# Patient Record
Sex: FEMALE | Race: WHITE | NOT HISPANIC OR LATINO | Employment: FULL TIME | URBAN - METROPOLITAN AREA
[De-identification: names, ages, dates, MRNs, and addresses within clinical notes are randomized per-mention and may not be internally consistent; named-entity substitution may affect disease eponyms.]

---

## 2023-04-04 ENCOUNTER — TELEPHONE (OUTPATIENT)
Dept: PRIMARY CARE | Facility: CLINIC | Age: 38
End: 2023-04-04
Payer: COMMERCIAL

## 2023-04-04 DIAGNOSIS — Z00.00 ENCOUNTER FOR PREVENTATIVE ADULT HEALTH CARE EXAMINATION: Primary | ICD-10-CM

## 2023-04-04 DIAGNOSIS — R79.89 LOW VITAMIN D LEVEL: ICD-10-CM

## 2023-04-07 ENCOUNTER — LAB (OUTPATIENT)
Dept: LAB | Facility: LAB | Age: 38
End: 2023-04-07
Payer: COMMERCIAL

## 2023-04-07 DIAGNOSIS — Z00.00 ENCOUNTER FOR PREVENTATIVE ADULT HEALTH CARE EXAMINATION: ICD-10-CM

## 2023-04-07 DIAGNOSIS — R79.89 LOW VITAMIN D LEVEL: ICD-10-CM

## 2023-04-07 PROBLEM — R29.898 PROXIMAL LEG WEAKNESS: Status: ACTIVE | Noted: 2023-04-07

## 2023-04-07 PROBLEM — U07.1 COVID-19 VIRUS DETECTED: Status: ACTIVE | Noted: 2023-04-07

## 2023-04-07 PROBLEM — M54.16 CHRONIC LUMBAR RADICULOPATHY: Status: ACTIVE | Noted: 2023-04-07

## 2023-04-07 PROBLEM — M51.26 LUMBAR DISC DISPLACEMENT WITHOUT MYELOPATHY: Status: ACTIVE | Noted: 2023-04-07

## 2023-04-07 PROBLEM — E66.9 CLASS 1 OBESITY WITH BODY MASS INDEX (BMI) OF 34.0 TO 34.9 IN ADULT: Status: ACTIVE | Noted: 2023-04-07

## 2023-04-07 PROBLEM — M54.50 LUMBAR PAIN: Status: ACTIVE | Noted: 2023-04-07

## 2023-04-07 PROBLEM — E66.9 OBESITY (BMI 35.0-39.9 WITHOUT COMORBIDITY): Status: ACTIVE | Noted: 2023-04-07

## 2023-04-07 PROBLEM — R60.0 PERIPHERAL EDEMA: Status: ACTIVE | Noted: 2023-04-07

## 2023-04-07 PROBLEM — G25.81 RESTLESS LEGS: Status: ACTIVE | Noted: 2023-04-07

## 2023-04-07 PROBLEM — R07.89 ATYPICAL CHEST PAIN: Status: ACTIVE | Noted: 2023-04-07

## 2023-04-07 PROBLEM — M47.817 LUMBOSACRAL SPONDYLOSIS: Status: ACTIVE | Noted: 2023-04-07

## 2023-04-07 PROBLEM — R59.0 SUPRACLAVICULAR ADENOPATHY: Status: ACTIVE | Noted: 2023-04-07

## 2023-04-07 PROBLEM — E66.811 CLASS 1 OBESITY WITH BODY MASS INDEX (BMI) OF 34.0 TO 34.9 IN ADULT: Status: ACTIVE | Noted: 2023-04-07

## 2023-04-07 PROBLEM — I83.93 VARICOSE VEINS OF LEGS: Status: ACTIVE | Noted: 2023-04-07

## 2023-04-07 PROBLEM — G43.909 MIGRAINE HEADACHE: Status: ACTIVE | Noted: 2023-04-07

## 2023-04-07 PROBLEM — M71.30 SYNOVIAL CYST: Status: ACTIVE | Noted: 2023-04-07

## 2023-04-07 PROBLEM — M54.50 LOW BACK ACHE: Status: ACTIVE | Noted: 2023-04-07

## 2023-04-07 PROBLEM — R60.9 PERIPHERAL EDEMA: Status: ACTIVE | Noted: 2023-04-07

## 2023-04-07 LAB
ALANINE AMINOTRANSFERASE (SGPT) (U/L) IN SER/PLAS: 17 U/L (ref 7–45)
ALBUMIN (G/DL) IN SER/PLAS: 3.7 G/DL (ref 3.4–5)
ALKALINE PHOSPHATASE (U/L) IN SER/PLAS: 55 U/L (ref 33–110)
ANION GAP IN SER/PLAS: 11 MMOL/L (ref 10–20)
ASPARTATE AMINOTRANSFERASE (SGOT) (U/L) IN SER/PLAS: 13 U/L (ref 9–39)
BASOPHILS (10*3/UL) IN BLOOD BY AUTOMATED COUNT: 0.05 X10E9/L (ref 0–0.1)
BASOPHILS/100 LEUKOCYTES IN BLOOD BY AUTOMATED COUNT: 0.8 % (ref 0–2)
BILIRUBIN TOTAL (MG/DL) IN SER/PLAS: 0.6 MG/DL (ref 0–1.2)
CALCIUM (MG/DL) IN SER/PLAS: 8.6 MG/DL (ref 8.6–10.3)
CARBON DIOXIDE, TOTAL (MMOL/L) IN SER/PLAS: 28 MMOL/L (ref 21–32)
CHLORIDE (MMOL/L) IN SER/PLAS: 103 MMOL/L (ref 98–107)
CHOLESTEROL (MG/DL) IN SER/PLAS: 197 MG/DL (ref 0–199)
CHOLESTEROL IN HDL (MG/DL) IN SER/PLAS: 54.5 MG/DL
CHOLESTEROL/HDL RATIO: 3.6
CREATININE (MG/DL) IN SER/PLAS: 0.76 MG/DL (ref 0.5–1.05)
EOSINOPHILS (10*3/UL) IN BLOOD BY AUTOMATED COUNT: 0.15 X10E9/L (ref 0–0.7)
EOSINOPHILS/100 LEUKOCYTES IN BLOOD BY AUTOMATED COUNT: 2.3 % (ref 0–6)
ERYTHROCYTE DISTRIBUTION WIDTH (RATIO) BY AUTOMATED COUNT: 12.8 % (ref 11.5–14.5)
ERYTHROCYTE MEAN CORPUSCULAR HEMOGLOBIN CONCENTRATION (G/DL) BY AUTOMATED: 31 G/DL (ref 32–36)
ERYTHROCYTE MEAN CORPUSCULAR VOLUME (FL) BY AUTOMATED COUNT: 88 FL (ref 80–100)
ERYTHROCYTES (10*6/UL) IN BLOOD BY AUTOMATED COUNT: 4.74 X10E12/L (ref 4–5.2)
GFR FEMALE: >90 ML/MIN/1.73M2
GLUCOSE (MG/DL) IN SER/PLAS: 78 MG/DL (ref 74–99)
HEMATOCRIT (%) IN BLOOD BY AUTOMATED COUNT: 41.9 % (ref 36–46)
HEMOGLOBIN (G/DL) IN BLOOD: 13 G/DL (ref 12–16)
IMMATURE GRANULOCYTES/100 LEUKOCYTES IN BLOOD BY AUTOMATED COUNT: 0.2 % (ref 0–0.9)
LDL: 120 MG/DL (ref 0–99)
LEUKOCYTES (10*3/UL) IN BLOOD BY AUTOMATED COUNT: 6.5 X10E9/L (ref 4.4–11.3)
LYMPHOCYTES (10*3/UL) IN BLOOD BY AUTOMATED COUNT: 2.93 X10E9/L (ref 1.2–4.8)
LYMPHOCYTES/100 LEUKOCYTES IN BLOOD BY AUTOMATED COUNT: 44.8 % (ref 13–44)
MONOCYTES (10*3/UL) IN BLOOD BY AUTOMATED COUNT: 0.51 X10E9/L (ref 0.1–1)
MONOCYTES/100 LEUKOCYTES IN BLOOD BY AUTOMATED COUNT: 7.8 % (ref 2–10)
NEUTROPHILS (10*3/UL) IN BLOOD BY AUTOMATED COUNT: 2.89 X10E9/L (ref 1.2–7.7)
NEUTROPHILS/100 LEUKOCYTES IN BLOOD BY AUTOMATED COUNT: 44.1 % (ref 40–80)
PLATELETS (10*3/UL) IN BLOOD AUTOMATED COUNT: 268 X10E9/L (ref 150–450)
POTASSIUM (MMOL/L) IN SER/PLAS: 4.3 MMOL/L (ref 3.5–5.3)
PROTEIN TOTAL: 6.4 G/DL (ref 6.4–8.2)
SODIUM (MMOL/L) IN SER/PLAS: 138 MMOL/L (ref 136–145)
THYROTROPIN (MIU/L) IN SER/PLAS BY DETECTION LIMIT <= 0.05 MIU/L: 3.23 MIU/L (ref 0.44–3.98)
TRIGLYCERIDE (MG/DL) IN SER/PLAS: 113 MG/DL (ref 0–149)
UREA NITROGEN (MG/DL) IN SER/PLAS: 11 MG/DL (ref 6–23)
VLDL: 23 MG/DL (ref 0–40)

## 2023-04-07 PROCEDURE — 84443 ASSAY THYROID STIM HORMONE: CPT

## 2023-04-07 PROCEDURE — 80053 COMPREHEN METABOLIC PANEL: CPT

## 2023-04-07 PROCEDURE — 80061 LIPID PANEL: CPT

## 2023-04-07 PROCEDURE — 82306 VITAMIN D 25 HYDROXY: CPT

## 2023-04-07 PROCEDURE — 36415 COLL VENOUS BLD VENIPUNCTURE: CPT

## 2023-04-07 PROCEDURE — 85025 COMPLETE CBC W/AUTO DIFF WBC: CPT

## 2023-04-07 RX ORDER — ALBUTEROL SULFATE 90 UG/1
AEROSOL, METERED RESPIRATORY (INHALATION)
COMMUNITY
Start: 2015-12-04

## 2023-04-07 RX ORDER — MELOXICAM 15 MG/1
1 TABLET ORAL DAILY PRN
COMMUNITY
Start: 2022-01-25

## 2023-04-07 RX ORDER — SUMATRIPTAN 50 MG/1
50 TABLET, FILM COATED ORAL
COMMUNITY
Start: 2016-08-10

## 2023-04-07 RX ORDER — METHOCARBAMOL 750 MG/1
1 TABLET, FILM COATED ORAL NIGHTLY
COMMUNITY
Start: 2022-01-25

## 2023-04-07 RX ORDER — ETONOGESTREL AND ETHINYL ESTRADIOL VAGINAL RING .015; .12 MG/D; MG/D
RING VAGINAL
COMMUNITY
Start: 2014-02-18

## 2023-04-08 LAB — CALCIDIOL (25 OH VITAMIN D3) (NG/ML) IN SER/PLAS: 53 NG/ML

## 2023-04-10 ENCOUNTER — OFFICE VISIT (OUTPATIENT)
Dept: PRIMARY CARE | Facility: CLINIC | Age: 38
End: 2023-04-10
Payer: COMMERCIAL

## 2023-04-10 VITALS
SYSTOLIC BLOOD PRESSURE: 126 MMHG | DIASTOLIC BLOOD PRESSURE: 78 MMHG | WEIGHT: 220 LBS | HEART RATE: 93 BPM | BODY MASS INDEX: 34.53 KG/M2 | HEIGHT: 67 IN | OXYGEN SATURATION: 97 %

## 2023-04-10 DIAGNOSIS — L30.9 DERMATITIS: Primary | ICD-10-CM

## 2023-04-10 PROCEDURE — 99395 PREV VISIT EST AGE 18-39: CPT | Performed by: FAMILY MEDICINE

## 2023-04-10 PROCEDURE — 1036F TOBACCO NON-USER: CPT | Performed by: FAMILY MEDICINE

## 2023-04-10 PROCEDURE — 99213 OFFICE O/P EST LOW 20 MIN: CPT | Performed by: FAMILY MEDICINE

## 2023-04-10 RX ORDER — MULTIVITAMIN/IRON/FOLIC ACID 18MG-0.4MG
1 TABLET ORAL DAILY
COMMUNITY

## 2023-04-10 RX ORDER — MULTIVITAMIN
1 TABLET ORAL DAILY
COMMUNITY

## 2023-04-10 RX ORDER — KETOCONAZOLE 20 MG/G
CREAM TOPICAL 2 TIMES DAILY
Qty: 60 G | Refills: 0 | Status: SHIPPED | OUTPATIENT
Start: 2023-04-10 | End: 2024-04-09

## 2023-04-10 ASSESSMENT — PATIENT HEALTH QUESTIONNAIRE - PHQ9
1. LITTLE INTEREST OR PLEASURE IN DOING THINGS: NOT AT ALL
2. FEELING DOWN, DEPRESSED OR HOPELESS: NOT AT ALL
SUM OF ALL RESPONSES TO PHQ9 QUESTIONS 1 AND 2: 0

## 2023-04-10 NOTE — PATIENT INSTRUCTIONS
We reviewed you labs.     Please look into the use of Phentermine or Semaglutide ( there are some cash pay weight loss clinics in the area).     BP looks great.     For the armpit itching, try the ketoconazole cream .     Follow up in 1 year or sooner as needed.

## 2023-04-10 NOTE — PROGRESS NOTES
Subjective   Patient ID: Kat Mcghee is a 37 y.o. female who presents for Annual Exam.    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   - on 1/17/22 ordered MRI and referral to pain management and ortho spine - MRI was denied  - has since seen Spine and Pain and both agree MRI would be greatly helpful to see if cyst has returned   - hurts so bad, 10/10 pain now   - pressure, pain in thighs and legs are getting weak   - starting on medrol dose pack 11/29/21- no improvement   - also strained her ankle doing lunges   - used old pain bills and flexeril - made her groggy   - motrin, stretches at home   - h/o disc herniations     #) supraclavicular LAD on the side of COVID vaccination - resolved   - no booster yet   - did not have live covid that she knows of     #) skin changes - not too bad  - varicose veins   - some throbbing and restless lags   - referred to vascular med- didn’t want to get the procedure   - is wearing compression stocking    #) Migraines - not as frequent  - taking Vit D and B complex, drinking more water   - seems like they are worse after pregnancy   - Motrin, no longer using the imitrex  - Cambia not covered  - tried ubrelvy     #) Asthma - mild-- feels better   - exacerbated with physical activity   - no need for inhaler     #) circulation - brother had a brain aneurysm at 45  - other than a smoker, no chronic condition  - with pt having headaches she is worried   - normal ECHO and carotid US     #) Heart??  - daughter has a bicuspid aortic valve    - normal ECHO in 2021      Review of Systems    Objective   /78   Pulse 93   Ht  "1.702 m (5' 7\")   Wt 99.8 kg (220 lb)   SpO2 97%   BMI 34.46 kg/m²     Physical Exam  Vitals reviewed.   Constitutional:       General: She is not in acute distress.     Appearance: She is not toxic-appearing.   HENT:      Head: Normocephalic and atraumatic.      Right Ear: Tympanic membrane and ear canal normal. There is no impacted cerumen.      Left Ear: Tympanic membrane and ear canal normal. There is no impacted cerumen.      Nose: No congestion or rhinorrhea.      Mouth/Throat:      Mouth: Mucous membranes are moist.      Pharynx: No oropharyngeal exudate or posterior oropharyngeal erythema.   Eyes:      Extraocular Movements: Extraocular movements intact.      Conjunctiva/sclera: Conjunctivae normal.      Pupils: Pupils are equal, round, and reactive to light.   Cardiovascular:      Rate and Rhythm: Normal rate and regular rhythm.      Heart sounds: Normal heart sounds. No murmur heard.  Pulmonary:      Effort: No respiratory distress.      Breath sounds: No wheezing.   Abdominal:      General: Bowel sounds are normal.      Palpations: Abdomen is soft.      Tenderness: There is no abdominal tenderness.   Musculoskeletal:         General: No deformity. Normal range of motion.      Cervical back: Neck supple. No tenderness.      Right lower leg: No edema.      Left lower leg: No edema.   Lymphadenopathy:      Cervical: No cervical adenopathy.   Skin:     Findings: No bruising or rash.   Neurological:      Mental Status: She is alert.      Cranial Nerves: No cranial nerve deficit.      Motor: No weakness.      Gait: Gait normal.   Psychiatric:         Mood and Affect: Mood normal.       Assessment/Plan   Problem List Items Addressed This Visit    None  Visit Diagnoses       Dermatitis    -  Primary    Relevant Medications    ketoconazole (NIZOral) 2 % cream        We reviewed you labs.     Please look into the use of Phentermine or Semaglutide ( there are some cash pay weight loss clinics in the area).     BP " looks great.     For the armpit itching, try the ketoconazole cream .

## 2023-04-17 ENCOUNTER — TELEPHONE (OUTPATIENT)
Dept: PRIMARY CARE | Facility: CLINIC | Age: 38
End: 2023-04-17
Payer: COMMERCIAL

## 2023-04-17 DIAGNOSIS — E66.1 CLASS 1 DRUG-INDUCED OBESITY WITH SERIOUS COMORBIDITY AND BODY MASS INDEX (BMI) OF 34.0 TO 34.9 IN ADULT: Primary | ICD-10-CM

## 2023-04-17 DIAGNOSIS — M54.16 CHRONIC LUMBAR RADICULOPATHY: ICD-10-CM

## 2023-04-18 RX ORDER — SEMAGLUTIDE 0.25 MG/.5ML
0.25 INJECTION, SOLUTION SUBCUTANEOUS
Qty: 2 ML | Refills: 11 | Status: SHIPPED | OUTPATIENT
Start: 2023-04-18 | End: 2024-04-17

## 2023-04-18 NOTE — TELEPHONE ENCOUNTER
Sent in Wegovy RX, we will see if your insurance will approve it.     Obesity is a chronic health condition and will require life long treatment either in the form of continued lifestyle changes, medication and/or both.   Weight loss goals: 50#  Current BMI is 35 with current weight 220  Goal BMI is less than 30. Although a reduction in 5% body weight is still greatly beneficial to your cardiovascular health.   There is a chance of losing up to 9.6-16% of your body weight in the next year if this medication is continued for at least one year in combination with diet and exercise.   You need to continue to work on healthy low calorie diets, login your calories in an NIEVES such as [employer] can be helpful to stay on track.   Also get at least 150 mins of regular exercise per week (more to speed up weight loss).  Long term safety of Wegovy was discussed. Please notify the office if you experience severe GI upset, diarrhea or vomiting, or any other changes in your health that may be related to this medication.

## 2023-04-29 DIAGNOSIS — M54.30 SCIATICA, UNSPECIFIED LATERALITY: Primary | ICD-10-CM

## 2023-04-29 RX ORDER — METHYLPREDNISOLONE 4 MG/1
TABLET ORAL
Qty: 21 TABLET | Refills: 0 | Status: SHIPPED | OUTPATIENT
Start: 2023-04-29 | End: 2023-05-06

## 2023-05-01 RX ORDER — PREDNISONE 20 MG/1
20 TABLET ORAL DAILY
COMMUNITY
Start: 2019-10-28

## 2023-05-05 ENCOUNTER — OFFICE VISIT (OUTPATIENT)
Dept: PRIMARY CARE | Facility: CLINIC | Age: 38
End: 2023-05-05
Payer: COMMERCIAL

## 2023-05-05 VITALS
HEART RATE: 74 BPM | DIASTOLIC BLOOD PRESSURE: 80 MMHG | SYSTOLIC BLOOD PRESSURE: 122 MMHG | WEIGHT: 212 LBS | BODY MASS INDEX: 33.2 KG/M2 | OXYGEN SATURATION: 96 %

## 2023-05-05 DIAGNOSIS — E66.09 CLASS 1 OBESITY DUE TO EXCESS CALORIES WITHOUT SERIOUS COMORBIDITY WITH BODY MASS INDEX (BMI) OF 33.0 TO 33.9 IN ADULT: ICD-10-CM

## 2023-05-05 DIAGNOSIS — M54.16 CHRONIC LUMBAR RADICULOPATHY: Primary | ICD-10-CM

## 2023-05-05 PROCEDURE — 3008F BODY MASS INDEX DOCD: CPT | Performed by: FAMILY MEDICINE

## 2023-05-05 PROCEDURE — 1036F TOBACCO NON-USER: CPT | Performed by: FAMILY MEDICINE

## 2023-05-05 PROCEDURE — 99213 OFFICE O/P EST LOW 20 MIN: CPT | Performed by: FAMILY MEDICINE

## 2023-05-05 PROCEDURE — 80307 DRUG TEST PRSMV CHEM ANLYZR: CPT

## 2023-05-05 RX ORDER — PHENTERMINE HYDROCHLORIDE 37.5 MG/1
37.5 TABLET ORAL
Qty: 30 TABLET | Refills: 0 | Status: SHIPPED | OUTPATIENT
Start: 2023-05-05 | End: 2023-12-08

## 2023-05-05 ASSESSMENT — PATIENT HEALTH QUESTIONNAIRE - PHQ9
1. LITTLE INTEREST OR PLEASURE IN DOING THINGS: NOT AT ALL
SUM OF ALL RESPONSES TO PHQ9 QUESTIONS 1 AND 2: 0
2. FEELING DOWN, DEPRESSED OR HOPELESS: NOT AT ALL

## 2023-05-05 NOTE — PROGRESS NOTES
Subjective   Patient ID: Kat Mcghee is a 37 y.o. female who presents for Follow-up (Discuss starting on Adipex).    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   - no insomnia, BP is good, no chest pain , palpitations   - new job, working with [employer]     #) back is better- will refer to new pain med doc   - flares seem to responsive to steroids   - on 1/17/22 ordered MRI and referral to pain management and ortho spine - MRI was denied  - has since seen Spine and Pain and both agree MRI would be greatly helpful to see if cyst has returned   - hurts so bad, 10/10 pain now   - pressure, pain in thighs and legs are getting weak   - starting on medrol dose pack 11/29/21- no improvement   - also strained her ankle doing lunges   - used old pain bills and flexeril - made her groggy   - motrin, stretches at home   - h/o disc herniations     #) supraclavicular LAD on the side of COVID vaccination - resolved   - no booster yet   - did not have live covid that she knows of     #) skin changes - not too bad  - varicose veins   - some throbbing and restless lags   - referred to vascular med- didn’t want to get the procedure   - is wearing compression stocking    #) Migraines - not as frequent  - taking Vit D and B complex, drinking more water   - seems like they are worse after pregnancy   - Motrin, no longer using the imitrex  - Cambia not covered  - tried ubrelvy     #) Asthma - mild-- feels better   - exacerbated with physical activity   - no need for inhaler     #) circulation - brother had a brain aneurysm at 45  - other than a smoker, no chronic condition  - with pt having headaches she is worried   - normal ECHO and carotid  US     #) Heart??  - daughter has a bicuspid aortic valve    - normal ECHO in 2021      Review of Systems    Objective   /80   Pulse 74   Wt 96.2 kg (212 lb)   SpO2 96%   BMI 33.20 kg/m²     Physical Exam  Vitals reviewed.   Constitutional:       General: She is not in acute distress.     Appearance: She is not toxic-appearing.   HENT:      Head: Normocephalic and atraumatic.      Right Ear: Tympanic membrane and ear canal normal. There is no impacted cerumen.      Left Ear: Tympanic membrane and ear canal normal. There is no impacted cerumen.      Nose: No congestion or rhinorrhea.      Mouth/Throat:      Mouth: Mucous membranes are moist.      Pharynx: No oropharyngeal exudate or posterior oropharyngeal erythema.   Eyes:      Extraocular Movements: Extraocular movements intact.      Conjunctiva/sclera: Conjunctivae normal.      Pupils: Pupils are equal, round, and reactive to light.   Cardiovascular:      Rate and Rhythm: Normal rate and regular rhythm.      Heart sounds: Normal heart sounds. No murmur heard.  Pulmonary:      Effort: No respiratory distress.      Breath sounds: No wheezing.   Abdominal:      General: Bowel sounds are normal.      Palpations: Abdomen is soft.      Tenderness: There is no abdominal tenderness.   Musculoskeletal:         General: No deformity. Normal range of motion.      Cervical back: Neck supple. No tenderness.      Right lower leg: No edema.      Left lower leg: No edema.   Lymphadenopathy:      Cervical: No cervical adenopathy.   Skin:     Findings: No bruising or rash.   Neurological:      Mental Status: She is alert.      Cranial Nerves: No cranial nerve deficit.      Motor: No weakness.      Gait: Gait normal.   Psychiatric:         Mood and Affect: Mood normal.       Assessment/Plan   Problem List Items Addressed This Visit    None  Start the phentermine     May cause constipation and dry mouth '    Leave a urine today     BP looks great.     Follow up in 1 month  for a weight in and refills.

## 2023-05-05 NOTE — PATIENT INSTRUCTIONS
Start the phentermine     May cause constipation and dry mouth '    Leave a urine today     BP looks great.     Follow up in 1 month for a weight in and refills.

## 2023-05-06 LAB
AMPHETAMINE (PRESENCE) IN URINE BY SCREEN METHOD: NORMAL
BARBITURATES PRESENCE IN URINE BY SCREEN METHOD: NORMAL
BENZODIAZEPINE (PRESENCE) IN URINE BY SCREEN METHOD: NORMAL
CANNABINOIDS IN URINE BY SCREEN METHOD: NORMAL
COCAINE (PRESENCE) IN URINE BY SCREEN METHOD: NORMAL
DRUG SCREEN COMMENT URINE: NORMAL
FENTANYL URINE: NORMAL
METHADONE (PRESENCE) IN URINE BY SCREEN METHOD: NORMAL
OPIATES (PRESENCE) IN URINE BY SCREEN METHOD: NORMAL
OXYCODONE (PRESENCE) IN URINE BY SCREEN METHOD: NORMAL
PHENCYCLIDINE (PRESENCE) IN URINE BY SCREEN METHOD: NORMAL

## 2023-06-02 ENCOUNTER — APPOINTMENT (OUTPATIENT)
Dept: PRIMARY CARE | Facility: CLINIC | Age: 38
End: 2023-06-02
Payer: COMMERCIAL

## 2023-08-10 ENCOUNTER — LAB (OUTPATIENT)
Dept: LAB | Facility: LAB | Age: 38
End: 2023-08-10
Payer: COMMERCIAL

## 2023-08-11 LAB — TOBACCO SCREEN, URINE: NEGATIVE

## 2023-12-06 ENCOUNTER — TELEPHONE (OUTPATIENT)
Dept: PRIMARY CARE | Facility: CLINIC | Age: 38
End: 2023-12-06
Payer: COMMERCIAL

## 2023-12-06 ENCOUNTER — PHARMACY VISIT (OUTPATIENT)
Dept: PHARMACY | Facility: CLINIC | Age: 38
End: 2023-12-06
Payer: COMMERCIAL

## 2023-12-06 PROCEDURE — RXMED WILLOW AMBULATORY MEDICATION CHARGE

## 2023-12-06 PROCEDURE — RXOTC WILLOW AMBULATORY OTC CHARGE

## 2023-12-06 NOTE — TELEPHONE ENCOUNTER
Pt. Called saying she has severe neck pain on the left side of her neck, pt. States it the same side as the lumps on her neck. Pt. Went to urgent care for help and they told her they couldn't do anything for her. Pt. Would like some direction on what to do or where to go.

## 2023-12-08 ENCOUNTER — OFFICE VISIT (OUTPATIENT)
Dept: PRIMARY CARE | Facility: CLINIC | Age: 38
End: 2023-12-08
Payer: COMMERCIAL

## 2023-12-08 VITALS
BODY MASS INDEX: 33.67 KG/M2 | DIASTOLIC BLOOD PRESSURE: 80 MMHG | OXYGEN SATURATION: 97 % | SYSTOLIC BLOOD PRESSURE: 124 MMHG | HEART RATE: 87 BPM | WEIGHT: 215 LBS

## 2023-12-08 DIAGNOSIS — K05.10 GINGIVITIS: Primary | ICD-10-CM

## 2023-12-08 PROCEDURE — 1036F TOBACCO NON-USER: CPT | Performed by: FAMILY MEDICINE

## 2023-12-08 PROCEDURE — 3008F BODY MASS INDEX DOCD: CPT | Performed by: FAMILY MEDICINE

## 2023-12-08 PROCEDURE — 99213 OFFICE O/P EST LOW 20 MIN: CPT | Performed by: FAMILY MEDICINE

## 2023-12-08 RX ORDER — DOXYCYCLINE 100 MG/1
100 CAPSULE ORAL 2 TIMES DAILY
Qty: 14 CAPSULE | Refills: 0 | Status: SHIPPED | OUTPATIENT
Start: 2023-12-08 | End: 2023-12-15

## 2023-12-08 ASSESSMENT — ENCOUNTER SYMPTOMS: NECK PAIN: 1

## 2023-12-08 ASSESSMENT — PATIENT HEALTH QUESTIONNAIRE - PHQ9
SUM OF ALL RESPONSES TO PHQ9 QUESTIONS 1 AND 2: 0
2. FEELING DOWN, DEPRESSED OR HOPELESS: NOT AT ALL
1. LITTLE INTEREST OR PLEASURE IN DOING THINGS: NOT AT ALL

## 2023-12-08 NOTE — PATIENT INSTRUCTIONS
Please switch to the doxycycline   Stop the amoxicillin     Please keep using the magic mouth wash     Please also look for the canker sore pads at the local pharmacy    BP looks good.     Monitor the lymph nodes which might take a few weeks to go down.     Please follow up as needed for preventative or sick visits.

## 2023-12-08 NOTE — PROGRESS NOTES
Subjective   Patient ID: Kat Mcghee is a 38 y.o. female who presents for Neck Pain (Left side of neck pain with swollen lump - still has sore in mouth and left ear pain/Motrin 800 mg, medicated mouth wash, Amoxicillin).    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   - no insomnia, BP is good, no chest pain , palpitations   - new job, working with [employer]     #) back is better- will refer to new pain med doc   - flares seem to responsive to steroids   - on 1/17/22 ordered MRI and referral to pain management and ortho spine - MRI was denied  - has since seen Spine and Pain and both agree MRI would be greatly helpful to see if cyst has returned   - hurts so bad, 10/10 pain now   - pressure, pain in thighs and legs are getting weak   - starting on medrol dose pack 11/29/21- no improvement   - also strained her ankle doing lunges   - used old pain bills and flexeril - made her groggy   - motrin, stretches at home   - h/o disc herniations     #) supraclavicular LAD on the side of COVID vaccination - resolved   - no booster yet   - did not have live covid that she knows of     #) skin changes - not too bad  - varicose veins   - some throbbing and restless lags   - referred to vascular med- didn’t want to get the procedure   -  is wearing compression stocking    #) Migraines - not as frequent  - taking Vit D and B complex, drinking more water   - seems like they are worse after pregnancy   - Motrin, no longer using the imitrex  - Cambia not covered  - tried ubrelvy     #) Asthma - mild-- feels better   - exacerbated with physical activity   - no need for inhaler     #) circulation - brother had a brain aneurysm at 45  - other than a smoker, no chronic condition  - with pt having headaches she is worried   - normal ECHO and carotid US     #) Heart??  - daughter has a bicuspid aortic valve    - normal ECHO in 2021      Review of Systems   Musculoskeletal:  Positive for neck pain.       Objective   /80   Pulse 87   Wt 97.5 kg (215 lb)   SpO2 97%   BMI 33.67 kg/m²     Physical Exam  Vitals reviewed.   Constitutional:       General: She is not in acute distress.     Appearance: She is not toxic-appearing.   HENT:      Head: Normocephalic and atraumatic.      Right Ear: Tympanic membrane and ear canal normal. There is no impacted cerumen.      Left Ear: Tympanic membrane and ear canal normal. There is no impacted cerumen.      Nose: No congestion or rhinorrhea.      Mouth/Throat:      Mouth: Mucous membranes are moist.      Pharynx: No oropharyngeal exudate or posterior oropharyngeal erythema.   Eyes:      Extraocular Movements: Extraocular movements intact.      Conjunctiva/sclera: Conjunctivae normal.      Pupils: Pupils are equal, round, and reactive to light.   Cardiovascular:      Rate and Rhythm: Normal rate and regular rhythm.      Heart sounds: Normal heart sounds. No murmur heard.  Pulmonary:      Effort: No respiratory distress.      Breath sounds: No wheezing.   Abdominal:      General: Bowel sounds are normal.      Palpations: Abdomen is soft.      Tenderness: There is no abdominal tenderness.   Musculoskeletal:         General: No deformity. Normal range of motion.      Cervical back: Neck supple. No tenderness.      Right  lower leg: No edema.      Left lower leg: No edema.   Lymphadenopathy:      Cervical: No cervical adenopathy.   Skin:     Findings: No bruising or rash.   Neurological:      Mental Status: She is alert.      Cranial Nerves: No cranial nerve deficit.      Motor: No weakness.      Gait: Gait normal.   Psychiatric:         Mood and Affect: Mood normal.       Assessment/Plan   Problem List Items Addressed This Visit    None    Please switch to the doxycycline   Stop the amoxicillin     Please keep using the magic mouth wash     Please also look for the canker sore pads at the local pharmacy    BP looks good.     Monitor the lymph nodes which might take a few weeks to go down.     Please follow up as needed for preventative or sick visits.

## 2023-12-22 DIAGNOSIS — S33.9XXD SPRAIN OF LIGAMENT OF LUMBOSACRAL JOINT, SUBSEQUENT ENCOUNTER: Primary | ICD-10-CM

## 2023-12-22 RX ORDER — METHYLPREDNISOLONE 4 MG/1
TABLET ORAL
Qty: 21 TABLET | Refills: 0 | Status: SHIPPED | OUTPATIENT
Start: 2023-12-22 | End: 2023-12-29

## 2024-04-15 ENCOUNTER — OFFICE VISIT (OUTPATIENT)
Dept: PRIMARY CARE | Facility: CLINIC | Age: 39
End: 2024-04-15
Payer: COMMERCIAL

## 2024-04-15 VITALS
WEIGHT: 220 LBS | HEIGHT: 67 IN | HEART RATE: 81 BPM | SYSTOLIC BLOOD PRESSURE: 131 MMHG | OXYGEN SATURATION: 97 % | BODY MASS INDEX: 34.53 KG/M2 | DIASTOLIC BLOOD PRESSURE: 78 MMHG

## 2024-04-15 DIAGNOSIS — Z00.00 PREVENTATIVE HEALTH CARE: Primary | ICD-10-CM

## 2024-04-15 DIAGNOSIS — M25.649 MORNING JOINT STIFFNESS OF HAND, UNSPECIFIED LATERALITY: ICD-10-CM

## 2024-04-15 DIAGNOSIS — E66.09 CLASS 1 OBESITY DUE TO EXCESS CALORIES WITHOUT SERIOUS COMORBIDITY WITH BODY MASS INDEX (BMI) OF 33.0 TO 33.9 IN ADULT: ICD-10-CM

## 2024-04-15 DIAGNOSIS — R79.89 LOW VITAMIN D LEVEL: ICD-10-CM

## 2024-04-15 PROCEDURE — 3008F BODY MASS INDEX DOCD: CPT | Performed by: FAMILY MEDICINE

## 2024-04-15 PROCEDURE — 99214 OFFICE O/P EST MOD 30 MIN: CPT | Performed by: FAMILY MEDICINE

## 2024-04-15 PROCEDURE — 1036F TOBACCO NON-USER: CPT | Performed by: FAMILY MEDICINE

## 2024-04-15 PROCEDURE — 99395 PREV VISIT EST AGE 18-39: CPT | Performed by: FAMILY MEDICINE

## 2024-04-15 ASSESSMENT — COLUMBIA-SUICIDE SEVERITY RATING SCALE - C-SSRS
6. HAVE YOU EVER DONE ANYTHING, STARTED TO DO ANYTHING, OR PREPARED TO DO ANYTHING TO END YOUR LIFE?: NO
1. IN THE PAST MONTH, HAVE YOU WISHED YOU WERE DEAD OR WISHED YOU COULD GO TO SLEEP AND NOT WAKE UP?: NO
2. HAVE YOU ACTUALLY HAD ANY THOUGHTS OF KILLING YOURSELF?: NO

## 2024-04-15 ASSESSMENT — ENCOUNTER SYMPTOMS
NECK PAIN: 1
DEPRESSION: 0
LOSS OF SENSATION IN FEET: 0
OCCASIONAL FEELINGS OF UNSTEADINESS: 0

## 2024-04-15 NOTE — PROGRESS NOTES
Subjective   Patient ID: Kat Mcghee is a 38 y.o. female who presents for Annual Exam (Physical ).    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   - did not like the phentermine - more anxious and off   - no insomnia, BP is good, no chest pain , palpitations   - new job, working with [employer]     #) back is better- will refer to new pain med doc   - flares seem to responsive to steroids   - on 1/17/22 ordered MRI and referral to pain management and ortho spine - MRI was denied  - has since seen Spine and Pain and both agree MRI would be greatly helpful to see if cyst has returned   - hurts so bad, 10/10 pain now   - pressure, pain in thighs and legs are getting weak   - starting on medrol dose pack 11/29/21- no improvement   - also strained her ankle doing lunges   - used old pain bills and flexeril - made her groggy   - motrin, stretches at home   - h/o disc herniations     #) supraclavicular LAD on the side of COVID vaccination - resolved   - no booster yet   - did not have live covid that she knows of     #) skin changes - not too bad  - varicose veins   - some throbbing and restless lags   - referred to vascular med- didn’t want to get the procedure   - is wearing compression stocking    #) Migraines - not as frequent  - taking Vit D and B complex, drinking more water   -  "seems like they are worse after pregnancy   - Motrin, no longer using the imitrex  - Cambia not covered  - tried ubrelvy     #) Asthma - mild-- feels better   - exacerbated with physical activity   - no need for inhaler     #) circulation - brother had a brain aneurysm at 45  - other than a smoker, no chronic condition  - with pt having headaches she is worried   - normal ECHO and carotid US     #) Heart??  - daughter has a bicuspid aortic valve    - normal ECHO in 2021      Review of Systems   Musculoskeletal:  Positive for neck pain.       Objective   /86   Pulse 81   Ht 1.702 m (5' 7\")   Wt 99.8 kg (220 lb)   SpO2 97%   BMI 34.46 kg/m²     Physical Exam  Vitals reviewed.   Constitutional:       General: She is not in acute distress.     Appearance: She is not toxic-appearing.   HENT:      Head: Normocephalic and atraumatic.      Right Ear: Tympanic membrane and ear canal normal. There is no impacted cerumen.      Left Ear: Tympanic membrane and ear canal normal. There is no impacted cerumen.      Nose: No congestion or rhinorrhea.      Mouth/Throat:      Mouth: Mucous membranes are moist.      Pharynx: No oropharyngeal exudate or posterior oropharyngeal erythema.   Eyes:      Extraocular Movements: Extraocular movements intact.      Conjunctiva/sclera: Conjunctivae normal.      Pupils: Pupils are equal, round, and reactive to light.   Cardiovascular:      Rate and Rhythm: Normal rate and regular rhythm.      Heart sounds: Normal heart sounds. No murmur heard.  Pulmonary:      Effort: No respiratory distress.      Breath sounds: No wheezing.   Abdominal:      General: Bowel sounds are normal.      Palpations: Abdomen is soft.      Tenderness: There is no abdominal tenderness.   Musculoskeletal:         General: No deformity. Normal range of motion.      Cervical back: Neck supple. No tenderness.      Right lower leg: No edema.      Left lower leg: No edema.   Lymphadenopathy:      Cervical: No cervical " adenopathy.   Skin:     Findings: No bruising or rash.   Neurological:      Mental Status: She is alert.      Cranial Nerves: No cranial nerve deficit.      Motor: No weakness.      Gait: Gait normal.   Psychiatric:         Mood and Affect: Mood normal.       Assessment/Plan   Problem List Items Addressed This Visit    None  Please get updated fasting blood work     We sampled you the Ozempicc today to give you 6 weeks of shots for free.   Then message me if you would like to continue with the medication as a compound since your insurance does not cover it.   Even though medications like Wegovy (semaglutide) and Zepbound (tirzepatide) demonstrate remarkable effectiveness for weight loss, frustration exists on accessing such Food and Drug Administration (FDA)-approved treatments due to medication shortages, lack of insurance coverage and high out-of-pocket costs. Such barriers can result in patients looking for available alternatives, such as compounded versions. Please be aware that compounded versions of semaglutide and tirzepatide may not be what they are advertised to be. These compounded versions are not the same as the drug provided by the manufacturers, they are considered “generics” or have been modified in some way for legal distribution. The FDA stated very clearly that, “Patients should be aware that some products sold as 'semaglutide' may not contain the same active ingredient as FDA-approved semaglutide products and may be the salt formulations.” We will be sending your prescription to [facility] in [place], [place]. They started in 1999 and have received recognition as a PCAB accredited pharmacy in both sterile and non-sterile compounding. The American Medical Association recommends only using BCAB accredited pharmacies, and only 1% of pharmacies meet that standard. Once they receive your RX, they should be reaching out to you for payment and shipping information. Please let us know  when you get it, so we may assist in administration guidance.   Look at [email or web address]     Please monitor the urinary incontinence , try some home Kegel exercises.     We could refer you to Dr Mcdaniel     Try the neurtec as needed for headaches.     Follow up in 90 days for weight check

## 2024-04-15 NOTE — PATIENT INSTRUCTIONS
Please get updated fasting blood work     We sampled you the Ozempicc today to give you 6 weeks of shots for free.   Then message me if you would like to continue with the medication as a compound since your insurance does not cover it.   Even though medications like Wegovy (semaglutide) and Zepbound (tirzepatide) demonstrate remarkable effectiveness for weight loss, frustration exists on accessing such Food and Drug Administration (FDA)-approved treatments due to medication shortages, lack of insurance coverage and high out-of-pocket costs. Such barriers can result in patients looking for available alternatives, such as compounded versions. Please be aware that compounded versions of semaglutide and tirzepatide may not be what they are advertised to be. These compounded versions are not the same as the drug provided by the manufacturers, they are considered “generics” or have been modified in some way for legal distribution. The FDA stated very clearly that, “Patients should be aware that some products sold as 'semaglutide' may not contain the same active ingredient as FDA-approved semaglutide products and may be the salt formulations.” We will be sending your prescription to [facility] in [place], [place]. They started in 1999 and have received recognition as a PCAB accredited pharmacy in both sterile and non-sterile compounding. The American Medical Association recommends only using BCAB accredited pharmacies, and only 1% of pharmacies meet that standard. Once they receive your RX, they should be reaching out to you for payment and shipping information. Please let us know when you get it, so we may assist in administration guidance.   Look at [email or web address]     Please monitor the urinary incontinence , try some home Kegel exercises.     We could refer you to Dr Mcdaniel     Try the neurtec as needed for headaches.     Follow up in 90 days for weight check

## 2024-04-20 ENCOUNTER — LAB (OUTPATIENT)
Dept: LAB | Facility: LAB | Age: 39
End: 2024-04-20
Payer: COMMERCIAL

## 2024-04-20 DIAGNOSIS — Z00.00 PREVENTATIVE HEALTH CARE: ICD-10-CM

## 2024-04-20 DIAGNOSIS — R79.89 LOW VITAMIN D LEVEL: ICD-10-CM

## 2024-04-20 DIAGNOSIS — M25.649 MORNING JOINT STIFFNESS OF HAND, UNSPECIFIED LATERALITY: ICD-10-CM

## 2024-04-20 LAB
25(OH)D3 SERPL-MCNC: 73 NG/ML (ref 30–100)
ALBUMIN SERPL BCP-MCNC: 4.3 G/DL (ref 3.4–5)
ALP SERPL-CCNC: 57 U/L (ref 33–110)
ALT SERPL W P-5'-P-CCNC: 16 U/L (ref 7–45)
ANION GAP SERPL CALC-SCNC: 12 MMOL/L (ref 10–20)
AST SERPL W P-5'-P-CCNC: 11 U/L (ref 9–39)
BASOPHILS # BLD AUTO: 0.04 X10*3/UL (ref 0–0.1)
BASOPHILS NFR BLD AUTO: 0.8 %
BILIRUB SERPL-MCNC: 0.6 MG/DL (ref 0–1.2)
BUN SERPL-MCNC: 8 MG/DL (ref 6–23)
CALCIUM SERPL-MCNC: 8.8 MG/DL (ref 8.6–10.3)
CHLORIDE SERPL-SCNC: 102 MMOL/L (ref 98–107)
CHOLEST SERPL-MCNC: 188 MG/DL (ref 0–199)
CHOLESTEROL/HDL RATIO: 3.8
CO2 SERPL-SCNC: 27 MMOL/L (ref 21–32)
CREAT SERPL-MCNC: 0.79 MG/DL (ref 0.5–1.05)
EGFRCR SERPLBLD CKD-EPI 2021: >90 ML/MIN/1.73M*2
EOSINOPHIL # BLD AUTO: 0.13 X10*3/UL (ref 0–0.7)
EOSINOPHIL NFR BLD AUTO: 2.7 %
ERYTHROCYTE [DISTWIDTH] IN BLOOD BY AUTOMATED COUNT: 12.6 % (ref 11.5–14.5)
GLUCOSE SERPL-MCNC: 85 MG/DL (ref 74–99)
HCT VFR BLD AUTO: 44.4 % (ref 36–46)
HDLC SERPL-MCNC: 49.6 MG/DL
HGB BLD-MCNC: 14.3 G/DL (ref 12–16)
IMM GRANULOCYTES # BLD AUTO: 0.01 X10*3/UL (ref 0–0.7)
IMM GRANULOCYTES NFR BLD AUTO: 0.2 % (ref 0–0.9)
LDLC SERPL CALC-MCNC: 114 MG/DL
LYMPHOCYTES # BLD AUTO: 2.38 X10*3/UL (ref 1.2–4.8)
LYMPHOCYTES NFR BLD AUTO: 48.8 %
MCH RBC QN AUTO: 28.2 PG (ref 26–34)
MCHC RBC AUTO-ENTMCNC: 32.2 G/DL (ref 32–36)
MCV RBC AUTO: 88 FL (ref 80–100)
MONOCYTES # BLD AUTO: 0.68 X10*3/UL (ref 0.1–1)
MONOCYTES NFR BLD AUTO: 13.9 %
NEUTROPHILS # BLD AUTO: 1.64 X10*3/UL (ref 1.2–7.7)
NEUTROPHILS NFR BLD AUTO: 33.6 %
NON HDL CHOLESTEROL: 138 MG/DL (ref 0–149)
NRBC BLD-RTO: 0 /100 WBCS (ref 0–0)
PLATELET # BLD AUTO: 268 X10*3/UL (ref 150–450)
POTASSIUM SERPL-SCNC: 4.3 MMOL/L (ref 3.5–5.3)
PROT SERPL-MCNC: 6.9 G/DL (ref 6.4–8.2)
RBC # BLD AUTO: 5.07 X10*6/UL (ref 4–5.2)
RHEUMATOID FACT SER NEPH-ACNC: <10 IU/ML (ref 0–15)
SODIUM SERPL-SCNC: 137 MMOL/L (ref 136–145)
TRIGL SERPL-MCNC: 120 MG/DL (ref 0–149)
TSH SERPL-ACNC: 3.32 MIU/L (ref 0.44–3.98)
VLDL: 24 MG/DL (ref 0–40)
WBC # BLD AUTO: 4.9 X10*3/UL (ref 4.4–11.3)

## 2024-04-20 PROCEDURE — 84443 ASSAY THYROID STIM HORMONE: CPT

## 2024-04-20 PROCEDURE — 86038 ANTINUCLEAR ANTIBODIES: CPT

## 2024-04-20 PROCEDURE — 36415 COLL VENOUS BLD VENIPUNCTURE: CPT

## 2024-04-20 PROCEDURE — 86431 RHEUMATOID FACTOR QUANT: CPT

## 2024-04-20 PROCEDURE — 80053 COMPREHEN METABOLIC PANEL: CPT

## 2024-04-20 PROCEDURE — 82306 VITAMIN D 25 HYDROXY: CPT

## 2024-04-20 PROCEDURE — 80061 LIPID PANEL: CPT

## 2024-04-20 PROCEDURE — 85025 COMPLETE CBC W/AUTO DIFF WBC: CPT

## 2024-04-23 LAB — ANA SER QL HEP2 SUBST: NEGATIVE

## 2024-05-03 ENCOUNTER — HOSPITAL ENCOUNTER (OUTPATIENT)
Dept: RADIOLOGY | Facility: HOSPITAL | Age: 39
Discharge: HOME | End: 2024-05-03
Payer: COMMERCIAL

## 2024-05-03 ENCOUNTER — OFFICE VISIT (OUTPATIENT)
Dept: ORTHOPEDIC SURGERY | Facility: HOSPITAL | Age: 39
End: 2024-05-03
Payer: COMMERCIAL

## 2024-05-03 DIAGNOSIS — M25.561 RIGHT KNEE PAIN, UNSPECIFIED CHRONICITY: ICD-10-CM

## 2024-05-03 DIAGNOSIS — M13.161 INFLAMMATION OF JOINT OF RIGHT KNEE: ICD-10-CM

## 2024-05-03 PROCEDURE — 99213 OFFICE O/P EST LOW 20 MIN: CPT | Performed by: ORTHOPAEDIC SURGERY

## 2024-05-03 PROCEDURE — 3008F BODY MASS INDEX DOCD: CPT | Performed by: ORTHOPAEDIC SURGERY

## 2024-05-03 PROCEDURE — 73564 X-RAY EXAM KNEE 4 OR MORE: CPT | Mod: RT

## 2024-05-03 PROCEDURE — 73564 X-RAY EXAM KNEE 4 OR MORE: CPT | Mod: RIGHT SIDE | Performed by: RADIOLOGY

## 2024-05-03 RX ORDER — MELOXICAM 15 MG/1
15 TABLET ORAL DAILY
Qty: 14 TABLET | Refills: 0 | Status: SHIPPED | OUTPATIENT
Start: 2024-05-03 | End: 2024-05-17

## 2024-05-06 NOTE — PROGRESS NOTES
HPI  This is a pleasant 38 y.o. female here today for right knee pain.  She says that the pain began while playing volleyball about 3 weeks ago did not have a specific injury just noted a sensation of achiness in the knee with some clicking anteriorly she comes to see me today for further evaluation she has not done any anti-inflammatories nor physical therapy or injections pain is all located in the front of the knee      Past Medical History:   Diagnosis Date    Encounter for antibody response examination 06/26/2018    Immunity status testing    Localized swelling, mass and lump, unspecified 06/14/2017    Skin nodule    Other chest pain 05/05/2017    Chest discomfort    Personal history of diseases of the skin and subcutaneous tissue 07/22/2016    History of sebaceous cyst    Personal history of other diseases of the musculoskeletal system and connective tissue 05/05/2017    History of low back pain    Personal history of other diseases of the respiratory system 12/04/2015    History of acute bronchitis    Personal history of other drug therapy     History of influenza vaccination    Plantar fascial fibromatosis 07/22/2016    Plantar fasciitis    Unspecified injury of left wrist, hand and finger(s), initial encounter 12/01/2016    Finger injury, left, initial encounter       No past surgical history on file.    Social History     Tobacco Use    Smoking status: Never    Smokeless tobacco: Never   Vaping Use    Vaping status: Never Used   Substance Use Topics    Drug use: Never           ROS  Reviewed and all pertinent positives mentioned in HPI.      EXAM  Patient is in no acute distress.  They are alert and oriented x3.  They are of normal mood and affect.  They are in no acute distress.  The patient's limb is warm and well-perfused.  They have intact sensation to light touch in all lower extremity dermatomes.  There is a minimal effusion.    The patient's quadriceps and hamstring strength is 5 of 5.    The  patient can do a straight leg raise with no radicular pain.  negative lachmans. negative posterior drawer.  There is 1+ patellofemoral crepitus.   The knee is stable to varus and valgus stress at both 0 and 30°.  There is no tenderness along the medial or lateral joint line.  negative McMurrays      IMAGING  Imaging reviewed today reveal no gross fracture or dislocation.  Degenerative changes noted in the PF compartment.  MRI reviewed reveals No MRI for review      ASSESSMENT  right patellofemoral arthrosis      PLAN  We will have the patient initiate a course of physical therapy and continue NSAIDs and activity modification.  If symptoms persist, we will see them back for re-evaluation.          Ronn Francisco MD

## 2024-07-08 ENCOUNTER — APPOINTMENT (OUTPATIENT)
Dept: PRIMARY CARE | Facility: CLINIC | Age: 39
End: 2024-07-08
Payer: COMMERCIAL

## 2024-07-08 VITALS
BODY MASS INDEX: 30.23 KG/M2 | DIASTOLIC BLOOD PRESSURE: 80 MMHG | HEART RATE: 84 BPM | OXYGEN SATURATION: 97 % | SYSTOLIC BLOOD PRESSURE: 124 MMHG | WEIGHT: 193 LBS

## 2024-07-08 DIAGNOSIS — J45.909 ASTHMA, UNSPECIFIED ASTHMA SEVERITY, UNSPECIFIED WHETHER COMPLICATED, UNSPECIFIED WHETHER PERSISTENT (HHS-HCC): Primary | ICD-10-CM

## 2024-07-08 PROCEDURE — 1036F TOBACCO NON-USER: CPT | Performed by: FAMILY MEDICINE

## 2024-07-08 PROCEDURE — 3008F BODY MASS INDEX DOCD: CPT | Performed by: FAMILY MEDICINE

## 2024-07-08 PROCEDURE — 99214 OFFICE O/P EST MOD 30 MIN: CPT | Performed by: FAMILY MEDICINE

## 2024-07-08 ASSESSMENT — ENCOUNTER SYMPTOMS: NECK PAIN: 1

## 2024-07-08 NOTE — PROGRESS NOTES
Subjective   Patient ID: Kat Mcghee is a 38 y.o. female who presents for Follow-up (3 month follow up on weight).    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     #) acute left neck pain   - 2 weeks ago the gums per hurting on the inner gums  - then had awefule left sided throat pains  - now with left sided LAD   - was treated with amoxicillin on Sat 500 TID 9 completed 7 days)   - got into the dentist on Monday- gave him magic mouthwash and topical steroid   - Urgent care on Tuesday - only saw a PA and no help   - was taking motrin   - no MSK pains     #) Obesity  - BMI 33.2 --> 34 --> 30.2   - START weight 220--> 215 --> 220 --> 193 in 90 days on the compounded semaglutide   - down 27#   - pt has been eating healthy, watching calories, and regular exercise.   - did not like the phentermine - more anxious and off   - no insomnia, BP is good, no chest pain , palpitations   - BP is great today now at 124/80   - new job, working with [employer]     #) back is better- will refer to new pain med doc   - flares seem to responsive to steroids   - on 1/17/22 ordered MRI and referral to pain management and ortho spine - MRI was denied  - has since seen Spine and Pain and both agree MRI would be greatly helpful to see if cyst has returned   - hurts so bad, 10/10 pain now   - pressure, pain in thighs and legs are getting weak   - starting on medrol dose pack 11/29/21- no improvement   - also strained her ankle doing lunges   - used old pain bills and flexeril - made her groggy   - motrin, stretches at home   - h/o disc herniations     #) supraclavicular LAD on the side of COVID vaccination - resolved   - no booster yet   - did not have live covid that she knows of     #) skin changes - not too bad  - varicose veins   - some throbbing and restless lags   - referred to vascular med- didn’t want to get the procedure   -  is wearing compression stocking    #) Migraines - not as frequent  - taking Vit D and B complex, drinking more water   - seems like they are worse after pregnancy   - Motrin, no longer using the imitrex  - Cambia not covered  - tried ubrelvy     #) Asthma - mild-- feels better   - exacerbated with physical activity   - no need for inhaler     #) circulation - brother had a brain aneurysm at 45  - other than a smoker, no chronic condition  - with pt having headaches she is worried   - normal ECHO and carotid US     #) Heart??  - daughter has a bicuspid aortic valve    - normal ECHO in 2021      Review of Systems   Musculoskeletal:  Positive for neck pain.       Objective   /80   Pulse 84   Wt 87.5 kg (193 lb)   SpO2 97%   BMI 30.23 kg/m²     Physical Exam  Vitals reviewed.   Constitutional:       General: She is not in acute distress.     Appearance: She is not toxic-appearing.   HENT:      Head: Normocephalic and atraumatic.      Right Ear: Tympanic membrane and ear canal normal. There is no impacted cerumen.      Left Ear: Tympanic membrane and ear canal normal. There is no impacted cerumen.      Nose: No congestion or rhinorrhea.      Mouth/Throat:      Mouth: Mucous membranes are moist.      Pharynx: No oropharyngeal exudate or posterior oropharyngeal erythema.   Eyes:      Extraocular Movements: Extraocular movements intact.      Conjunctiva/sclera: Conjunctivae normal.      Pupils: Pupils are equal, round, and reactive to light.   Cardiovascular:      Rate and Rhythm: Normal rate and regular rhythm.      Heart sounds: Normal heart sounds. No murmur heard.  Pulmonary:      Effort: No respiratory distress.      Breath sounds: No wheezing.   Abdominal:      General: Bowel sounds are normal.      Palpations: Abdomen is soft.      Tenderness: There is no abdominal tenderness.   Musculoskeletal:         General: No deformity. Normal range of motion.      Cervical back: Neck supple. No tenderness.      Right  lower leg: No edema.      Left lower leg: No edema.   Lymphadenopathy:      Cervical: No cervical adenopathy.   Skin:     Findings: No bruising or rash.   Neurological:      Mental Status: She is alert.      Cranial Nerves: No cranial nerve deficit.      Motor: No weakness.      Gait: Gait normal.   Psychiatric:         Mood and Affect: Mood normal.       Assessment/Plan   Problem List Items Addressed This Visit       Asthma ([facility]) - Primary     Labs on 4.20.24 - Cholesterol looks about the same with , goal is less than 100. We will monitor. Vitamin D levels look good, normal autoimmune screening test. Normal electrolytes, normal liver and kidney function. Normal thyroid screen.; Normal blood counts.     Glad you are liking the compounded semaglutide   You can increase the dose from 10 units to 15 units     You are down 27# in 90 days   You are down 12.3% of total body weight.     Please monitor the urinary incontinence , try some home Kegel exercises.   We could refer you to Dr Mcdaniel at some point if you would like     Try the neurtec as needed for headaches.     Follow up in 90 days for weight check

## 2024-07-08 NOTE — PATIENT INSTRUCTIONS
Labs on 4.20.24 - Cholesterol looks about the same with , goal is less than 100. We will monitor. Vitamin D levels look good, normal autoimmune screening test. Normal electrolytes, normal liver and kidney function. Normal thyroid screen.; Normal blood counts.     Glad you are liking the compounded semaglutide   You can increase the dose from 10 units to 15 units     You are down 27# in 90 days   You are down 12.3% of total body weight.     Please monitor the urinary incontinence , try some home Kegel exercises.   We could refer you to Dr Mcdaniel at some point if you would like     Try the neurtec as needed for headaches.     Follow up in 90 days for weight check

## 2024-08-19 DIAGNOSIS — H69.91 EUSTACHIAN TUBE DYSFUNCTION, RIGHT: Primary | ICD-10-CM

## 2024-08-19 RX ORDER — FLUTICASONE PROPIONATE 50 MCG
1 SPRAY, SUSPENSION (ML) NASAL DAILY
Qty: 16 G | Refills: 5 | Status: SHIPPED | OUTPATIENT
Start: 2024-08-19 | End: 2025-08-19

## 2024-08-19 NOTE — PROGRESS NOTES
Pt called with c/o right ear fullness, popping and dizziness  Will refer to ENT  Already on prednisone daily  In the meantime, recommended flonase and pseudofed

## 2024-09-05 ENCOUNTER — OFFICE VISIT (OUTPATIENT)
Dept: ORTHOPEDIC SURGERY | Facility: CLINIC | Age: 39
End: 2024-09-05
Payer: COMMERCIAL

## 2024-09-05 VITALS — HEIGHT: 67 IN | BODY MASS INDEX: 30.29 KG/M2 | WEIGHT: 193 LBS

## 2024-09-05 DIAGNOSIS — G56.01 CARPAL TUNNEL SYNDROME ON RIGHT: Primary | ICD-10-CM

## 2024-09-05 PROCEDURE — 99213 OFFICE O/P EST LOW 20 MIN: CPT | Performed by: ORTHOPAEDIC SURGERY

## 2024-09-05 PROCEDURE — 20526 THER INJECTION CARP TUNNEL: CPT | Mod: RT | Performed by: ORTHOPAEDIC SURGERY

## 2024-09-05 PROCEDURE — 2500000004 HC RX 250 GENERAL PHARMACY W/ HCPCS (ALT 636 FOR OP/ED): Performed by: ORTHOPAEDIC SURGERY

## 2024-09-05 RX ORDER — TRIAMCINOLONE ACETONIDE 40 MG/ML
20 INJECTION, SUSPENSION INTRA-ARTICULAR; INTRAMUSCULAR
Status: COMPLETED | OUTPATIENT
Start: 2024-09-05 | End: 2024-09-05

## 2024-09-05 ASSESSMENT — PAIN DESCRIPTION - DESCRIPTORS: DESCRIPTORS: NUMBNESS;TINGLING

## 2024-09-05 ASSESSMENT — PAIN SCALES - GENERAL: PAINLEVEL_OUTOF10: 5 - MODERATE PAIN

## 2024-09-05 ASSESSMENT — PAIN - FUNCTIONAL ASSESSMENT: PAIN_FUNCTIONAL_ASSESSMENT: 0-10

## 2024-09-05 NOTE — PROGRESS NOTES
[facility]  Hand and Upper Extremity Service  Initial evaluation / Consultation       Chief Complaint: Right hand        38 y.o right hand dominant female presenting for several week history of nocturnal right hand numbness and tingling which causes sleep disturbances and morning hand stiffness. She indicates that activities like driving and using her hair dryer seem to aggravate her symptoms. She describes a positive shake's sign and has a brace but doesn't wear it.            Please refer to New Patient Intake Form scanned into patient's electronic record for self reported past medical history, past surgical history, medications, allergies, family history, social history and 10 point review of systems    Examination:  Constitutional: Oriented to person, place, and time.  Appears well-developed and well-nourished.  Head: Normocephalic and atraumatic.  Eyes: Pupils are equal, round, and reactive to light.  Cardiovascular: Intact distal pulses.  Pulmonary/Chest/Breast: Effort normal. No respiratory distress.  Neurological: Alert and oriented to person, place, and time.  Skin: Skin is warm and dry.  Psychiatric: normal mood and affect.  Behavior is normal.  Musculoskeletal: Right hand reveals normal appearance. No thenar atrophy. Full finger and thumb flexion without triggering. Positive David median nerve compression test. Positive Tinel's sign and positive Phalen test. Diminished sensation in thumb, index, and middle finger.        Personal Interpretation of Diagnostic studies: No new images obtained       Impression:  Right carpal tunnel syndrome       Plan: We discussed treatment options and recommend she wear the brace at nighttime while sleeping. She has agreed to proceed with right carpal tunnel injection and will follow up as needed for recurrence of symptoms.       In Office Procedures Performed: Right carpal tunnel injection   Hand / UE Inj/Asp: R carpal tunnel for carpal  tunnel syndrome on 9/5/2024 3:52 PM  Indications: pain and therapeutic  Details: 25 G needle, volar approach  Medications: 20 mg triamcinolone acetonide 40 mg/mL  Outcome: tolerated well, no immediate complications  Procedure, treatment alternatives, risks and benefits explained, specific risks discussed. Consent was given by the patient. Immediately prior to procedure a time out was called to verify the correct patient, procedure, equipment, support staff and site/side marked as required. Patient was prepped and draped in the usual sterile fashion.             Follow up: As needed              Shay Ramirez MD  [facility]  Department of Orthopaedic Surgery  Hand and Upper Extremity Reconstruction      Scribe Attestation  By signing my name below, I, Nga Estrada , Scrnadya   attest that this documentation has been prepared under the direction and in the presence of Dr. Shay Ramirez.      Dictation performed with the use of voice recognition software.  Syntax and grammatical errors may exist.

## 2024-10-02 ENCOUNTER — CLINICAL SUPPORT (OUTPATIENT)
Dept: AUDIOLOGY | Facility: CLINIC | Age: 39
End: 2024-10-02
Payer: COMMERCIAL

## 2024-10-02 ENCOUNTER — APPOINTMENT (OUTPATIENT)
Dept: OTOLARYNGOLOGY | Facility: CLINIC | Age: 39
End: 2024-10-02
Payer: COMMERCIAL

## 2024-10-02 VITALS
SYSTOLIC BLOOD PRESSURE: 136 MMHG | TEMPERATURE: 97.4 F | BODY MASS INDEX: 30.23 KG/M2 | HEIGHT: 67 IN | DIASTOLIC BLOOD PRESSURE: 91 MMHG

## 2024-10-02 DIAGNOSIS — H93.8X1 CLOGGED EAR, RIGHT: Primary | ICD-10-CM

## 2024-10-02 DIAGNOSIS — H69.91 EUSTACHIAN TUBE DYSFUNCTION, RIGHT: ICD-10-CM

## 2024-10-02 PROCEDURE — 92557 COMPREHENSIVE HEARING TEST: CPT | Performed by: AUDIOLOGIST

## 2024-10-02 PROCEDURE — 99203 OFFICE O/P NEW LOW 30 MIN: CPT | Performed by: OTOLARYNGOLOGY

## 2024-10-02 PROCEDURE — 1036F TOBACCO NON-USER: CPT | Performed by: OTOLARYNGOLOGY

## 2024-10-02 PROCEDURE — 92550 TYMPANOMETRY & REFLEX THRESH: CPT | Performed by: AUDIOLOGIST

## 2024-10-02 ASSESSMENT — ENCOUNTER SYMPTOMS: OCCASIONAL FEELINGS OF UNSTEADINESS: 0

## 2024-10-02 ASSESSMENT — PAIN - FUNCTIONAL ASSESSMENT: PAIN_FUNCTIONAL_ASSESSMENT: 0-10

## 2024-10-02 ASSESSMENT — PAIN SCALES - GENERAL: PAINLEVEL_OUTOF10: 0 - NO PAIN

## 2024-10-02 NOTE — PROGRESS NOTES
AUDIOLOGY ADULT AUDIOMETRIC EVALUATION      Name:  Kat Mcghee  :  1985  Age:  39 y.o.  Date of Evaluation: 10/02/24    History:  Reason for visit:  Ms. Mcghee was seen today as part of the visit with Jose Shepard D.O. for an evaluation of hearing.   Chief Complaint   Patient presents with    Ear Fullness     Clogged ear     Reported for the past four months she has been experiencing a sensation of random right sided ear pressure or a clogged ear. Stated she has been able to pop the ear, however, the ear may then randomly clog. This has been a repeated problem for the past two months, despite various attempts to relive her symptom she has been unable to do so. Stated the ear may feel clogged for hours at a time. Mentioned she did attempt to utilize some hydrogen peroxide in the ear, but it did not relieve the symptoms of the clogged ear. She did notice some slight dizziness when the peroxide went in the ear and when she sat up.   She does have some current sinus congestion and may have a slight cold currently, however, denied any previous ear/sinus infections.   Stated she has noticed some occasional non-bothersome non-pulsatile ringing tinnitus at times.   Denied any concerns for hearing loss or difficulty communicating.   Denied any otalgia, ear surgery, recent falls, significant noise exposure, sinus/throat concerns, ear drainage, or sudden hearing loss.    EVALUATION     See Audiogram    RESULTS:    Otoscopic Evaluation:   Right Ear: Otoscopy revealed a clear healthy canal and a healthy tympanic membrane was visualized.   Left Ear:  Otoscopy revealed a clear healthy canal and a healthy tympanic membrane was visualized.     Immittance:  Immittance Measures: 226 Hz   Right Ear: Tympanometric testing revealed a normal type A tympanogram with normal middle ear pressure and normal static compliance.  Left Ear: Tympanometric testing revealed a normal type A tympanogram with normal middle ear pressure and  normal static compliance.    Right: Ipsilateral acoustic reflexes were present at, 500-4,000 Hz, at expected sensation levels.  Left Ear: Ipsilateral acoustic reflexes were present at, 500-4,000 Hz, at expected sensation levels.    Test technique:  Pure Tone Audiometry via TDH headphones    Reliability:   excellent    Pure Tone Audiometry:    Right Ear: Audiometric testing indicated normal peripheral hearing sensitivity from 125-8,000 Hz.  Left Ear:   Audiometric testing indicated normal peripheral hearing sensitivity from 125-8,000 Hz.      Speech Audiometry:   Right Ear:  Speech Reception Threshold (SRT) was obtained at 5 dBHL                 Word Recognition scores were excellent (100%) in quiet when words were presented at 45 dBHL  Left Ear:  Speech Reception Threshold (SRT) was obtained at 0 dBHL                 Word Recognition scores were excellent (100%) in quiet when words were presented at 40 dBHL  Testing was performed with recorded NU-6 speech words in quiet. Speech thresholds were in good agreement with the pure tone averages in each ear.     IMPRESSIONS:  Today's test results are consistent with normal peripheral hearing sensitivity, bilaterally.  The patient was counseled with regard to the findings.    RECOMMENDATIONS:  * Continue medical follow up with Jose Shepard D.O.  * Retest as medically indicated, or sooner if a change in hearing sensitivity is noticed.   * Wear hearing protection while in the presence of loud sounds.   * Use tinnitus coping strategies as needed, such as sound apps on a smart phone, utilizing calming noise in the room, running a fan at night, etc.     PATIENT EDUCATION:   Discussed results and recommendations with the patient.  Questions were addressed and the patient was encouraged to contact our department should concerns arise.  The patient was seen from  11:00-11:30 am.

## 2024-10-02 NOTE — PROGRESS NOTES
Impression:  1. Eustachian tube dysfunction, right  Referral to ENT           RECOMMENDATIONS/PLAN :  I reassured the patient that her hearing is normal and both tympanic membranes are moving normally today.  If her right ear plugs up again, she will start Flonase nasal spray-2 puffs each nostril daily for 4 to 6 weeks.  She can otherwise follow-up with her family physician as needed      **This electronic medical record note was created with the use of voice recognition software.  Despite proofreading, typographical or grammatical errors may be present that could affect meaning of content **    Subjective   Patient ID:     Kat Mcghee is a 39 y.o. female who presents to the office today complaining of intermittent pressure in her right ear.  This has been ongoing for the last couple of months.  She denies any drainage from the ear or any upper respiratory complaints fever or chills.  No pus draining from her sinuses.  Currently her ears feel better today.  No fluctuation in hearing and no vertigo.    ROS:  A detailed 12 system review of systems is noted on the intake form has been reviewed with the patient with details noted in the HPI and scanned into the patient's medical record.    Objective     Past Medical History:   Diagnosis Date    Encounter for antibody response examination 06/26/2018    Immunity status testing    Localized swelling, mass and lump, unspecified 06/14/2017    Skin nodule    Other chest pain 05/05/2017    Chest discomfort    Personal history of diseases of the skin and subcutaneous tissue 07/22/2016    History of sebaceous cyst    Personal history of other diseases of the musculoskeletal system and connective tissue 05/05/2017    History of low back pain    Personal history of other diseases of the respiratory system 12/04/2015    History of acute bronchitis    Personal history of other drug therapy     History of influenza vaccination    Plantar fascial fibromatosis 07/22/2016    Plantar  fasciitis    Unspecified injury of left wrist, hand and finger(s), initial encounter 12/01/2016    Finger injury, left, initial encounter        History reviewed. No pertinent surgical history.     Allergies   Allergen Reactions    Benzonatate Unknown          Current Outpatient Medications:     b complex 0.4 mg tablet, Take 1 tablet by mouth once daily., Disp: , Rfl:     etonogestreL-ethinyl estradioL (Nuvaring) 0.12-0.015 mg/24 hr vaginal ring, NuvaRing 0.12-0.015 MG/24HR Vaginal Ring  Quantity: 1  Refills: 0      Start : 18-Feb-2014  Active, Disp: , Rfl:     multivitamin tablet, Take 1 tablet by mouth once daily., Disp: , Rfl:     albuterol 90 mcg/actuation inhaler, INHALE 1-2 PUFFS EVERY 4-6 HOURS AS NEEDED AND AS DIRECTED., Disp: , Rfl:     BMX ORAL SUSPENSION (1:1:1), Rinse fo 2 minutes, then spit and/or swallow. Avoid eating fo 30 minutes after use (Patient not taking: Reported on 4/15/2024), Disp: 480 mL, Rfl: 0    fluticasone (Flonase) 50 mcg/actuation nasal spray, Administer 1 spray into each nostril once daily. Shake gently. Before first use, prime pump. After use, clean tip and replace cap., Disp: 16 g, Rfl: 5    meloxicam (Mobic) 15 mg tablet, Take 1 tablet (15 mg) by mouth once daily as needed., Disp: , Rfl:     methocarbamol (Robaxin) 750 mg tablet, Take 1 tablet (750 mg) by mouth once daily at bedtime., Disp: , Rfl:     predniSONE (Deltasone) 20 mg tablet, Take 1 tablet (20 mg) by mouth once daily., Disp: , Rfl:     semaglutide, weight loss, (Wegovy) 0.25 mg/0.5 mL pen injector, Inject 0.25 mg under the skin every 7 days. (Patient not taking: Reported on 12/8/2023), Disp: 2 mL, Rfl: 11    SUMAtriptan (Imitrex) 50 mg tablet, 1 tablet (50 mg). MAY REPEAT EVERY 2 HOURS. MAX 200MG/DAY., Disp: , Rfl:      Tobacco Use: Low Risk  (10/2/2024)    Patient History     Smoking Tobacco Use: Never     Smokeless Tobacco Use: Never     Passive Exposure: Not on file        Alcohol Use: Not on file        Social  "History     Substance and Sexual Activity   Drug Use Never        Physical Exam:  Visit Vitals  BP (!) 136/91   Temp 36.3 °C (97.4 °F) (Temporal)   Ht 1.702 m (5' 7\")   BMI 30.23 kg/m²   Smoking Status Never   BSA 2.03 m²      General: Patient is alert, oriented, cooperative in no apparent distress.  Head: Normocephalic, atraumatic.  Eyes: PERRL, EOMI, Conjunctiva is clear. No nystagmus.  Ears: Right Ear-- Pinna is normal.  External auditory canal is patent. Tympanic membrane is [intact, translucent and has good mobility with my pneumatic otoscope. No effusion].  Mastoid is nontender.  Left ear-- Pinna is normal.  External auditory canal is patent. Tympanic membrane is [intact, translucent and has good mobility with my pneumatic otoscope.  No effusion].  Mastoid is nontender.  Nose: Septum is straight.  No septal perforation or lesions. No septal hematoma/ seroma.  No signs of bleeding.  Inferior turbinates are normal.   No evidence of intranasal polyps.  No infectious drainage.  Throat:  Floor of mouth is clear, no masses.  Tongue appears normal, no lesions or masses. Gums, gingiva, buccal mucosa appear pink and moist, no lesions. Teeth are in good repair.  No obvious dental infections.  Peritonsillar regions appear symmetric without swelling.  Hard and soft palate appear normal, no obvious cleft. Uvula is midline.  Oropharynx: No lesions. Retropharyngeal wall is flat.  No active postnasal drip.  Neck: Supple,  no lymphadenopathy.  No masses.  Salivary Glands: Symmetric bilaterally.  No palpable masses.  No evidence of acute infection or salivary stones  Neurologic: Cranial Nerves 2-12 are grossly intact without focal deficits. Cerebellar function testing is normal.     Results:   I reviewed her recent audiogram and her hearing is within normal limits for both ears.  Both tympanic membranes have normal mobility on tympanometry.  Word recognition scores were 100% bilaterally.  Speech reception threshold is 5 dB in " the right ear and 0 dB in the left ear.    Procedure:   []    Jose Shepard, DO

## 2024-10-07 ENCOUNTER — APPOINTMENT (OUTPATIENT)
Dept: PRIMARY CARE | Facility: CLINIC | Age: 39
End: 2024-10-07
Payer: COMMERCIAL

## 2024-10-07 VITALS
WEIGHT: 178 LBS | HEART RATE: 72 BPM | BODY MASS INDEX: 27.88 KG/M2 | OXYGEN SATURATION: 100 % | DIASTOLIC BLOOD PRESSURE: 80 MMHG | SYSTOLIC BLOOD PRESSURE: 116 MMHG

## 2024-10-07 DIAGNOSIS — J01.00 ACUTE NON-RECURRENT MAXILLARY SINUSITIS: Primary | ICD-10-CM

## 2024-10-07 PROCEDURE — 1036F TOBACCO NON-USER: CPT | Performed by: FAMILY MEDICINE

## 2024-10-07 PROCEDURE — 99214 OFFICE O/P EST MOD 30 MIN: CPT | Performed by: FAMILY MEDICINE

## 2024-10-07 RX ORDER — AZITHROMYCIN 250 MG/1
TABLET, FILM COATED ORAL
Qty: 6 TABLET | Refills: 0 | Status: SHIPPED | OUTPATIENT
Start: 2024-10-07 | End: 2024-10-12

## 2024-10-07 ASSESSMENT — PATIENT HEALTH QUESTIONNAIRE - PHQ9
2. FEELING DOWN, DEPRESSED OR HOPELESS: NOT AT ALL
SUM OF ALL RESPONSES TO PHQ9 QUESTIONS 1 AND 2: 0
1. LITTLE INTEREST OR PLEASURE IN DOING THINGS: NOT AT ALL

## 2024-10-07 ASSESSMENT — ENCOUNTER SYMPTOMS: NECK PAIN: 1

## 2024-10-07 NOTE — PATIENT INSTRUCTIONS
Labs on 4.20.24 - Cholesterol looks about the same with , goal is less than 100. We will monitor. Vitamin D levels look good, normal autoimmune screening test. Normal electrolytes, normal liver and kidney function. Normal thyroid screen.; Normal blood counts.     Glad you are liking the compounded semaglutide   You can increase the dose from 20 to 25 if you would like to    You are down 42# in 6 months  You are down 19% of total body weight.     Please monitor the urinary incontinence , try some home Kegel exercises.   We could refer you to Dr Mcdaniel at some point if you would like     Try the neurtec as needed for headaches. Monitor for snoring and AM headaches     Follow up in 90 days for weight check

## 2024-10-07 NOTE — PROGRESS NOTES
Subjective   Patient ID: Kat Mcghee is a 39 y.o. female who presents for Follow-up (90 day follow up).    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   - did not like the phentermine - more anxious and off   - no insomnia, BP is good, no chest pain , palpitations   - BP is great today now at 116/80  - new job, working with [employer]     #) back is better- will refer to new pain med doc   - flares seem to responsive to steroids   - if stand or walks for too long starts to hurt   - tried an injection in the past   - on 1/17/22 ordered MRI and referral to pain management and ortho spine - MRI was denied  - has since seen Spine and Pain and both agree MRI would be greatly helpful to see if cyst has returned   - hurts so bad, 10/10 pain now   - pressure, pain in thighs and legs are getting weak   - starting on medrol dose pack 11/29/21- no improvement   - also strained her ankle doing lunges   - used old pain bills and flexeril - made her groggy   - motrin, stretches at home   - h/o disc herniations     #) supraclavicular LAD on the side of COVID vaccination - resolved   - no booster yet   - did not have live covid that she knows of     #) skin changes - not too bad  - varicose veins   - some throbbing and restless lags   - referred to vascular med- didn’t want to get the procedure   - is wearing compression stocking    #) Migraines - not as frequent  - taking Vit D and B complex, drinking more water   - seems like  they are worse after pregnancy   - Motrin, no longer using the imitrex  - Cambia not covered  - tried ubrelvy     #) Asthma - mild-- feels better   - exacerbated with physical activity   - no need for inhaler     #) circulation - brother had a brain aneurysm at 45  - other than a smoker, no chronic condition  - with pt having headaches she is worried   - normal ECHO and carotid US     #) Heart??  - daughter has a bicuspid aortic valve    - normal ECHO in 2021      Review of Systems   Musculoskeletal:  Positive for neck pain.       Objective   /80   Pulse 72   Wt 80.7 kg (178 lb)   SpO2 100%   BMI 27.88 kg/m²     Physical Exam  Vitals reviewed.   Constitutional:       General: She is not in acute distress.     Appearance: She is not toxic-appearing.   HENT:      Head: Normocephalic and atraumatic.      Right Ear: Tympanic membrane and ear canal normal. There is no impacted cerumen.      Left Ear: Tympanic membrane and ear canal normal. There is no impacted cerumen.      Nose: No congestion or rhinorrhea.      Mouth/Throat:      Mouth: Mucous membranes are moist.      Pharynx: No oropharyngeal exudate or posterior oropharyngeal erythema.   Eyes:      Extraocular Movements: Extraocular movements intact.      Conjunctiva/sclera: Conjunctivae normal.      Pupils: Pupils are equal, round, and reactive to light.   Cardiovascular:      Rate and Rhythm: Normal rate and regular rhythm.      Heart sounds: Normal heart sounds. No murmur heard.  Pulmonary:      Effort: No respiratory distress.      Breath sounds: No wheezing.   Abdominal:      General: Bowel sounds are normal.      Palpations: Abdomen is soft.      Tenderness: There is no abdominal tenderness.   Musculoskeletal:         General: No deformity. Normal range of motion.      Cervical back: Neck supple. No tenderness.      Right lower leg: No edema.      Left lower leg: No edema.   Lymphadenopathy:      Cervical: No cervical adenopathy.   Skin:      Findings: No bruising or rash.   Neurological:      Mental Status: She is alert.      Cranial Nerves: No cranial nerve deficit.      Motor: No weakness.      Gait: Gait normal.   Psychiatric:         Mood and Affect: Mood normal.       Assessment/Plan   Problem List Items Addressed This Visit    None    Labs on 4.20.24 - Cholesterol looks about the same with , goal is less than 100. We will monitor. Vitamin D levels look good, normal autoimmune screening test. Normal electrolytes, normal liver and kidney function. Normal thyroid screen.; Normal blood counts.     Glad you are liking the compounded semaglutide   You can increase the dose from 20 to 25 if you would like to    You are down 42# in 6 months  You are down 19% of total body weight.     Please monitor the urinary incontinence , try some home Kegel exercises.   We could refer you to Dr Mcdaniel at some point if you would like     Try the neurtec as needed for headaches. Monitor for snoring and AM headaches     Follow up in 90 days for weight check

## 2024-12-18 ENCOUNTER — OFFICE VISIT (OUTPATIENT)
Dept: ORTHOPEDIC SURGERY | Facility: CLINIC | Age: 39
End: 2024-12-18
Payer: COMMERCIAL

## 2024-12-18 ENCOUNTER — HOSPITAL ENCOUNTER (OUTPATIENT)
Dept: RADIOLOGY | Facility: CLINIC | Age: 39
Discharge: HOME | End: 2024-12-18
Payer: COMMERCIAL

## 2024-12-18 DIAGNOSIS — M25.511 RIGHT SHOULDER PAIN, UNSPECIFIED CHRONICITY: Primary | ICD-10-CM

## 2024-12-18 DIAGNOSIS — M25.511 RIGHT SHOULDER PAIN, UNSPECIFIED CHRONICITY: ICD-10-CM

## 2024-12-18 DIAGNOSIS — M75.81 RIGHT ROTATOR CUFF TENDINITIS: ICD-10-CM

## 2024-12-18 PROCEDURE — 99213 OFFICE O/P EST LOW 20 MIN: CPT | Performed by: STUDENT IN AN ORGANIZED HEALTH CARE EDUCATION/TRAINING PROGRAM

## 2024-12-18 PROCEDURE — 73030 X-RAY EXAM OF SHOULDER: CPT | Mod: RT

## 2024-12-18 PROCEDURE — 73030 X-RAY EXAM OF SHOULDER: CPT | Mod: RIGHT SIDE | Performed by: RADIOLOGY

## 2024-12-18 NOTE — PROGRESS NOTES
CHIEF COMPLAINT: No chief complaint on file.    History: 39 y.o. female presents to the office today for evaluation of her right shoulder.  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  She has been having ongoing right shoulder and scapular pain for the last 8 months.  No specific injury she can remember.  She is active at the gym.  Also is being worked up for carpal tunnel syndrome.  States that the pain is primarily laterally based as well as in the medial scapular border.  Worse with heavier activities.  Does not have significant pain at night.    Past medical history, past surgical history, medications, allergies, family history, social history, and review of systems were reviewed today.    A 12 point review of systems was negative other than as stated in the HPI.    Past Medical History:   Diagnosis Date    Encounter for antibody response examination 06/26/2018    Immunity status testing    Localized swelling, mass and lump, unspecified 06/14/2017    Skin nodule    Other chest pain 05/05/2017    Chest discomfort    Personal history of diseases of the skin and subcutaneous tissue 07/22/2016    History of sebaceous cyst    Personal history of other diseases of the musculoskeletal system and connective tissue 05/05/2017    History of low back pain    Personal history of other diseases of the respiratory system 12/04/2015    History of acute bronchitis    Personal history of other drug therapy     History of influenza vaccination    Plantar fascial fibromatosis 07/22/2016    Plantar fasciitis    Unspecified injury of left wrist, hand and finger(s), initial encounter 12/01/2016    Finger injury, left, initial encounter        Allergies   Allergen Reactions    Benzonatate Unknown        No past surgical history on file.     Family History   Problem Relation Name Age of Onset    Other (meniscal injury) Mother      Osteoarthritis Mother          Social History     Socioeconomic History    Marital status:       Spouse name: Not on file    Number of children: Not on file    Years of education: Not on file    Highest education level: Not on file   Occupational History    Not on file   Tobacco Use    Smoking status: Never    Smokeless tobacco: Never   Vaping Use    Vaping status: Never Used   Substance and Sexual Activity    Alcohol use: Not on file    Drug use: Never    Sexual activity: Not on file   Other Topics Concern    Not on file   Social History Narrative    Not on file     Social Drivers of Health     Financial Resource Strain: Not on file   Food Insecurity: Not on file   Transportation Needs: Not on file   Physical Activity: Not on file   Stress: Not on file   Social Connections: Not on file   Intimate Partner Violence: Not on file   Housing Stability: Not on file        CURRENT MEDICATIONS:   Current Outpatient Medications   Medication Sig Dispense Refill    albuterol 90 mcg/actuation inhaler INHALE 1-2 PUFFS EVERY 4-6 HOURS AS NEEDED AND AS DIRECTED.      b complex 0.4 mg tablet Take 1 tablet by mouth once daily.      BMX ORAL SUSPENSION (1:1:1) Rinse fo 2 minutes, then spit and/or swallow. Avoid eating fo 30 minutes after use (Patient not taking: Reported on 4/15/2024) 480 mL 0    etonogestreL-ethinyl estradioL (Nuvaring) 0.12-0.015 mg/24 hr vaginal ring NuvaRing 0.12-0.015 MG/24HR Vaginal Ring   Quantity: 1  Refills: 0        Start : 18-Feb-2014   Active      fluticasone (Flonase) 50 mcg/actuation nasal spray Administer 1 spray into each nostril once daily. Shake gently. Before first use, prime pump. After use, clean tip and replace cap. 16 g 5    meloxicam (Mobic) 15 mg tablet Take 1 tablet (15 mg) by mouth once daily as needed.      methocarbamol (Robaxin) 750 mg tablet Take 1 tablet (750 mg) by mouth once daily at bedtime.      multivitamin tablet Take 1 tablet by mouth once daily.      predniSONE (Deltasone) 20 mg tablet Take 1 tablet (20 mg) by mouth once daily.      semaglutide, weight loss, (Wegovy) 0.25  "mg/0.5 mL pen injector Inject 0.25 mg under the skin every 7 days. (Patient not taking: Reported on 12/8/2023) 2 mL 11    SUMAtriptan (Imitrex) 50 mg tablet 1 tablet (50 mg). MAY REPEAT EVERY 2 HOURS. MAX 200MG/DAY.       No current facility-administered medications for this visit.       Physical Examination:      12/6/2023     8:35 AM 12/8/2023    10:10 AM 4/15/2024     5:43 PM 7/8/2024     5:41 PM 9/5/2024     8:32 AM 10/2/2024    12:52 PM 10/7/2024     6:00 PM   Vitals   Systolic 150 124 131 124  136 116   Diastolic 94 80 78 80  91 80   Heart Rate 97 87 81 84   72   Temp 36.7 °C (98.1 °F)     36.3 °C (97.4 °F)    Resp 20         Height   1.702 m (5' 7\")  1.702 m (5' 7\") 1.702 m (5' 7\")    Weight (lb) 209 215 220 193 193  178   BMI 32.73 kg/m2 33.67 kg/m2 34.46 kg/m2 30.23 kg/m2 30.23 kg/m2 30.23 kg/m2 27.88 kg/m2   BSA (m2) 2.12 m2 2.15 m2 2.17 m2 2.03 m2 2.03 m2 2.03 m2 1.95 m2   Visit Report  Report Report Report Report Report Report      There is no height or weight on file to calculate BMI.    Well-appearing, appears stated age, pleasant and cooperative, appropriate mood and behavior. Height and weight reviewed. Alert and oriented x3.  Auditory function intact.  No acute distress.  Intact ocular function, LIDIA, EOMI. Breathing is unlabored .  There is no evidence of jugular venous distension. Skin appearance is normal without evidence of rash or other lesions. 2+ radial pulses bilaterally, fingers pink and wwp, good capillary refill, no pitting edema. No appreciable lymphadenopathy in bilateral upper extremities. SILT throughout both upper extremities, median/radial/ulnar/musculocutaneous/axillary nerve motor and sensory intact (except for abnormalities noted in focused musculoskeletal exam section below).     Neck exam: Full range of motion of the neck in flexion/extension and rotational movements. No significant areas of tenderness to palpation in the neck.    On exam of bilateral upper extremities, does " have preserved range of motion and strength of both shoulders.  Forward flexion 180, external rotation is 60, internal Tatian to T12.  Full strength rotator cuff strength testing bilaterally.  Minimal pain with Neer and Koch maneuvers of the right shoulder.    Imaging: Radiographs of the right shoulder performed today.  Personally interpreted by myself.  Preserved glenohumeral joint space.  Preserved acromiohumeral interval.  No acute fractures noted.       Assessment: Right periscapular pain, rotator cuff tendinitis    Plan: Patient's symptoms, test result and exam are consistent with a diagnosis of rotator cuff tendinitis and periscapular pain.  We did discuss the natural history of this.  Conservative management is often the first-line treatment for this, which includes physical therapy, injections and activity modification.  In rare cases of recalcitrant pain, surgery may be indicated, but this is only after extensive nonoperative care.  Patient understands this.  They wish to proceed with physical therapy.      Dragon software was used to dictate this note, please be aware that minor errors in transcription may be present.    Wander Frazier MD    Shoulder/Elbow Surgery  [facility]

## 2025-01-13 ENCOUNTER — APPOINTMENT (OUTPATIENT)
Dept: PRIMARY CARE | Facility: CLINIC | Age: 40
End: 2025-01-13
Payer: COMMERCIAL

## 2025-01-13 VITALS
HEART RATE: 78 BPM | SYSTOLIC BLOOD PRESSURE: 124 MMHG | OXYGEN SATURATION: 97 % | WEIGHT: 165 LBS | BODY MASS INDEX: 25.84 KG/M2 | DIASTOLIC BLOOD PRESSURE: 76 MMHG

## 2025-01-13 DIAGNOSIS — Z79.899 HIGH RISK MEDICATION USE: ICD-10-CM

## 2025-01-13 DIAGNOSIS — E66.811 CLASS 1 OBESITY DUE TO EXCESS CALORIES WITHOUT SERIOUS COMORBIDITY WITH BODY MASS INDEX (BMI) OF 33.0 TO 33.9 IN ADULT: Primary | ICD-10-CM

## 2025-01-13 DIAGNOSIS — E66.09 CLASS 1 OBESITY DUE TO EXCESS CALORIES WITHOUT SERIOUS COMORBIDITY WITH BODY MASS INDEX (BMI) OF 33.0 TO 33.9 IN ADULT: Primary | ICD-10-CM

## 2025-01-13 DIAGNOSIS — J45.909 ASTHMA, UNSPECIFIED ASTHMA SEVERITY, UNSPECIFIED WHETHER COMPLICATED, UNSPECIFIED WHETHER PERSISTENT (HHS-HCC): ICD-10-CM

## 2025-01-13 PROCEDURE — 99213 OFFICE O/P EST LOW 20 MIN: CPT | Performed by: FAMILY MEDICINE

## 2025-01-13 PROCEDURE — 1036F TOBACCO NON-USER: CPT | Performed by: FAMILY MEDICINE

## 2025-01-13 PROCEDURE — 80307 DRUG TEST PRSMV CHEM ANLYZR: CPT

## 2025-01-13 RX ORDER — PHENTERMINE HYDROCHLORIDE 37.5 MG/1
37.5 TABLET ORAL
Qty: 30 TABLET | Refills: 0 | Status: SHIPPED | OUTPATIENT
Start: 2025-01-13 | End: 2025-02-12

## 2025-01-13 NOTE — PATIENT INSTRUCTIONS
Lets add on some adipex ( phentermine) start with half in the AM and then increase to 1 full tab as tolerated.     Glad you are liking the compounded semaglutide , samples today  Try to reduce to 0.5 mg every 10-14 days.     You are down 55# in 9 months  You are down 25% of total body weight.     Please monitor the urinary incontinence , try some home Kegel exercises.   We could refer you to Dr Mcdaniel at some point if you would like     Try the neurtec as needed for headaches. Monitor for snoring and AM headaches     Follow up in 30 for weight check

## 2025-01-13 NOTE — PROGRESS NOTES
Subjective   Kat Mcghee is a 39 y.o. female who presents for Follow-up (90 day f/u).    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   - did not like the phentermine - more anxious and off   - no insomnia, BP is good, no chest pain , palpitations   - BP is great today now at 116/80--> 124/76   - new job, working with [employer]      #) back is better- will refer to new pain med doc   - flares seem to responsive to steroids   - if stand or walks for too long starts to hurt   - tried an injection in the past   - on 1/17/22 ordered MRI and referral to pain management and ortho spine - MRI was denied  - has since seen Spine and Pain and both agree MRI would be greatly helpful to see if cyst has returned   - hurts so bad, 10/10 pain now   - pressure, pain in thighs and legs are getting weak   - starting on medrol dose pack 11/29/21- no improvement   - also strained her ankle doing lunges   - used old pain bills and flexeril - made her groggy   - motrin, stretches at home   - h/o disc herniations      #) supraclavicular LAD on the side of COVID vaccination - resolved   - no booster yet   - did not have live covid that she knows of      #) skin changes - not too bad  - varicose veins   - some throbbing and restless lags   - referred to vascular med- didn’t want to get the procedure   - is wearing compression stocking     #) Migraines - not as frequent  - taking Vit D  and B complex, drinking more water   - seems like they are worse after pregnancy   - Motrin, no longer using the imitrex  - Cambia not covered  - tried ubrelvy      #) Asthma - mild-- feels better   - exacerbated with physical activity   - no need for inhaler      #) circulation - brother had a brain aneurysm at 45  - other than a smoker, no chronic condition  - with pt having headaches she is worried   - normal ECHO and carotid US      #) Heart??  - daughter has a bicuspid aortic valve    - normal ECHO in 2021     ROS was completed and all systems are negative with the exception of what was noted in the the HPI.     Objective     /76   Pulse 78   Wt 74.8 kg (165 lb)   SpO2 97%   BMI 25.84 kg/m²      Physical Exam  GEN: A+O, no acute distress  HEENT: NC/AT, Oropharynx clear, no exudates, TM visualized, Extraoccular muscles intact, no facial droop; no thyromegaly or cervical LAD  RESP: CTAB, no wheezes   CV: RRR, no murmurs  ABD: soft, non-tender, + BS  SKIN: no rashes or bruising, no peripheral edema   NEURO: CN II-XII grossly intact, moves all extremities equally, no tremor   PSYCH: normal affect, appropriate mood     Assessment/Plan   Problem List Items Addressed This Visit    None    Lets add on some adipex ( phentermine) start with half in the AM and then increase to 1 full tab as tolerated.     Glad you are liking the compounded semaglutide , samples today  Try to reduce to 0.5 mg every 10-14 days.     You are down 55# in 9 months  You are down 25% of total body weight.     Please monitor the urinary incontinence , try some home Kegel exercises.   We could refer you to Dr Mcdaniel at some point if you would like     Try the neurtec as needed for headaches. Monitor for snoring and AM headaches     Follow up in 30 days for weight check          Rubia Pro, DO, MSMed, ABOM  [address]   [phone]   [place] [address]  Ph. [phone]  Fx. [phone]

## 2025-01-14 LAB
AMPHETAMINES UR QL SCN: NORMAL
BARBITURATES UR QL SCN: NORMAL
BENZODIAZ UR QL SCN: NORMAL
BZE UR QL SCN: NORMAL
CANNABINOIDS UR QL SCN: NORMAL
FENTANYL+NORFENTANYL UR QL SCN: NORMAL
METHADONE UR QL SCN: NORMAL
OPIATES UR QL SCN: NORMAL
OXYCODONE+OXYMORPHONE UR QL SCN: NORMAL
PCP UR QL SCN: NORMAL

## 2025-02-17 ENCOUNTER — APPOINTMENT (OUTPATIENT)
Dept: PRIMARY CARE | Facility: CLINIC | Age: 40
End: 2025-02-17
Payer: COMMERCIAL

## 2025-02-17 VITALS
BODY MASS INDEX: 26.16 KG/M2 | WEIGHT: 167 LBS | DIASTOLIC BLOOD PRESSURE: 78 MMHG | HEART RATE: 76 BPM | OXYGEN SATURATION: 99 % | SYSTOLIC BLOOD PRESSURE: 120 MMHG

## 2025-02-17 DIAGNOSIS — E66.09 CLASS 1 OBESITY DUE TO EXCESS CALORIES WITHOUT SERIOUS COMORBIDITY WITH BODY MASS INDEX (BMI) OF 33.0 TO 33.9 IN ADULT: ICD-10-CM

## 2025-02-17 DIAGNOSIS — Z00.00 PREVENTATIVE HEALTH CARE: ICD-10-CM

## 2025-02-17 DIAGNOSIS — G43.019 INTRACTABLE MIGRAINE WITHOUT AURA AND WITHOUT STATUS MIGRAINOSUS: ICD-10-CM

## 2025-02-17 DIAGNOSIS — M54.50 LUMBAR PAIN: ICD-10-CM

## 2025-02-17 DIAGNOSIS — E66.811 CLASS 1 OBESITY DUE TO EXCESS CALORIES WITHOUT SERIOUS COMORBIDITY WITH BODY MASS INDEX (BMI) OF 33.0 TO 33.9 IN ADULT: ICD-10-CM

## 2025-02-17 PROCEDURE — 1036F TOBACCO NON-USER: CPT | Performed by: FAMILY MEDICINE

## 2025-02-17 PROCEDURE — 99213 OFFICE O/P EST LOW 20 MIN: CPT | Performed by: FAMILY MEDICINE

## 2025-02-17 ASSESSMENT — PATIENT HEALTH QUESTIONNAIRE - PHQ9
SUM OF ALL RESPONSES TO PHQ9 QUESTIONS 1 AND 2: 0
1. LITTLE INTEREST OR PLEASURE IN DOING THINGS: NOT AT ALL
2. FEELING DOWN, DEPRESSED OR HOPELESS: NOT AT ALL

## 2025-02-17 NOTE — PROGRESS NOTES
Subjective   Kat Mcghee is a 39 y.o. female who presents for Follow-up (90 day follow up).    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   - did not like the phentermine - more anxious and off   - no insomnia, BP is good, no chest pain , palpitations   - BP is great today now at 116/80--> 124/76 --> 120/78   - new job, working with [employer]      #) back is better- will referred to new pain med doc last visit  - flares seem to responsive to steroids   - if stand or walks for too long starts to hurt   - tried an injection in the past   - on 1/17/22 ordered MRI and referral to pain management and ortho spine - MRI was denied  - has since seen Spine and Pain and both agree MRI would be greatly helpful to see if cyst has returned   - hurts so bad, 10/10 pain now   - pressure, pain in thighs and legs are getting weak   - starting on medrol dose pack 11/29/21- no improvement   - also strained her ankle doing lunges   - used old pain bills and flexeril - made her groggy   - motrin, stretches at home   - h/o disc herniations      #) supraclavicular LAD on the side of COVID vaccination - resolved   - no booster yet   - did not have live covid that she knows of      #) skin changes - not too bad  - varicose veins   - some throbbing and restless lags   - referred to vascular med-  didn’t want to get the procedure   - is wearing compression stocking     #) Migraines - not as frequent  - taking Vit D and B complex, drinking more water   - seems like they are worse after pregnancy   - Motrin, no longer using the imitrex  - Cambia not covered  - tried ubrelvy      #) Asthma - mild-- feels better   - exacerbated with physical activity   - no need for inhaler      #) circulation - brother had a brain aneurysm at 45  - other than a smoker, no chronic condition  - with pt having headaches she is worried   - normal ECHO and carotid US      #) Heart??  - daughter has a bicuspid aortic valve    - normal ECHO in 2021     ROS was completed and all systems are negative with the exception of what was noted in the the HPI.     Objective     /78   Pulse 76   Wt 75.8 kg (167 lb)   SpO2 99%   BMI 26.16 kg/m²      Physical Exam  GEN: A+O, no acute distress  HEENT: NC/AT, Oropharynx clear, no exudates, TM visualized, Extraoccular muscles intact, no facial droop; no thyromegaly or cervical LAD  RESP: CTAB, no wheezes   CV: RRR, no murmurs  ABD: soft, non-tender, + BS  SKIN: no rashes or bruising, no peripheral edema   NEURO: CN II-XII grossly intact, moves all extremities equally, no tremor   PSYCH: normal affect, appropriate mood     Assessment/Plan   Problem List Items Addressed This Visit    None    Ok to stop the phentermine     Glad you are liking the compounded semaglutide , samples today  Refill of the compound to stay on the 20 units every 7 -10 days     You are down 55# in 9 months  You are down 25% of total body weight.     Please monitor the urinary incontinence , try some home Kegel exercises.   We could refer you to Dr Mcdaniel at some point if you would like     Try the neurtec as needed for headaches. Monitor for snoring and AM headaches     Follow up in 90 days for weight check        Rubia Pro, DO, MSMed, ABOM  [address]   [phone]   [place] [address]  Ph. [phone]  Fx.  [phone]

## 2025-02-17 NOTE — PATIENT INSTRUCTIONS
Ok to stop the phentermine     Glad you are liking the compounded semaglutide , samples today  Refill of the compound to stay on the 20 units every 7 -10 days     You are down 55# in 9 months  You are down 25% of total body weight.     Please monitor the urinary incontinence , try some home Kegel exercises.   We could refer you to Dr Mcdaniel at some point if you would like     Try the neurtec as needed for headaches. Monitor for snoring and AM headaches     Follow up in 90 days for weight check

## 2025-02-18 PROBLEM — U07.1 COVID-19 VIRUS DETECTED: Status: RESOLVED | Noted: 2023-04-07 | Resolved: 2025-02-18

## 2025-04-13 DIAGNOSIS — M54.50 LUMBAR PAIN: Primary | ICD-10-CM

## 2025-04-13 RX ORDER — METHYLPREDNISOLONE 4 MG/1
TABLET ORAL
Qty: 21 TABLET | Refills: 0 | Status: SHIPPED | OUTPATIENT
Start: 2025-04-13 | End: 2025-04-19

## 2025-04-15 LAB
ALBUMIN SERPL-MCNC: 4 G/DL (ref 3.6–5.1)
ALP SERPL-CCNC: 36 U/L (ref 31–125)
ALT SERPL-CCNC: 14 U/L (ref 6–29)
ANION GAP SERPL CALCULATED.4IONS-SCNC: 8 MMOL/L (CALC) (ref 7–17)
AST SERPL-CCNC: 11 U/L (ref 10–30)
BASOPHILS # BLD AUTO: 30 CELLS/UL (ref 0–200)
BASOPHILS NFR BLD AUTO: 0.6 %
BILIRUB SERPL-MCNC: 0.6 MG/DL (ref 0.2–1.2)
BUN SERPL-MCNC: 10 MG/DL (ref 7–25)
CALCIUM SERPL-MCNC: 8.5 MG/DL (ref 8.6–10.2)
CHLORIDE SERPL-SCNC: 106 MMOL/L (ref 98–110)
CHOLEST SERPL-MCNC: 175 MG/DL
CHOLEST/HDLC SERPL: 2.9 (CALC)
CO2 SERPL-SCNC: 25 MMOL/L (ref 20–32)
CREAT SERPL-MCNC: 0.66 MG/DL (ref 0.5–0.97)
EGFRCR SERPLBLD CKD-EPI 2021: 115 ML/MIN/1.73M2
EOSINOPHIL # BLD AUTO: 80 CELLS/UL (ref 15–500)
EOSINOPHIL NFR BLD AUTO: 1.6 %
ERYTHROCYTE [DISTWIDTH] IN BLOOD BY AUTOMATED COUNT: 12.1 % (ref 11–15)
GLUCOSE SERPL-MCNC: 79 MG/DL (ref 65–99)
HCT VFR BLD AUTO: 40.3 % (ref 35–45)
HDLC SERPL-MCNC: 60 MG/DL
HGB BLD-MCNC: 12.9 G/DL (ref 11.7–15.5)
LDLC SERPL CALC-MCNC: 94 MG/DL (CALC)
LYMPHOCYTES # BLD AUTO: 2650 CELLS/UL (ref 850–3900)
LYMPHOCYTES NFR BLD AUTO: 53 %
MCH RBC QN AUTO: 28.9 PG (ref 27–33)
MCHC RBC AUTO-ENTMCNC: 32 G/DL (ref 32–36)
MCV RBC AUTO: 90.4 FL (ref 80–100)
MONOCYTES # BLD AUTO: 320 CELLS/UL (ref 200–950)
MONOCYTES NFR BLD AUTO: 6.4 %
NEUTROPHILS # BLD AUTO: 1920 CELLS/UL (ref 1500–7800)
NEUTROPHILS NFR BLD AUTO: 38.4 %
NONHDLC SERPL-MCNC: 115 MG/DL (CALC)
PLATELET # BLD AUTO: 206 THOUSAND/UL (ref 140–400)
PMV BLD REES-ECKER: 10.1 FL (ref 7.5–12.5)
POTASSIUM SERPL-SCNC: 3.6 MMOL/L (ref 3.5–5.3)
PROT SERPL-MCNC: 6.4 G/DL (ref 6.1–8.1)
RBC # BLD AUTO: 4.46 MILLION/UL (ref 3.8–5.1)
SODIUM SERPL-SCNC: 139 MMOL/L (ref 135–146)
TRIGL SERPL-MCNC: 118 MG/DL
TSH SERPL-ACNC: 3.94 MIU/L
WBC # BLD AUTO: 5 THOUSAND/UL (ref 3.8–10.8)

## 2025-04-17 DIAGNOSIS — Z00.00 PREVENTATIVE HEALTH CARE: ICD-10-CM

## 2025-05-19 ENCOUNTER — APPOINTMENT (OUTPATIENT)
Dept: PRIMARY CARE | Facility: CLINIC | Age: 40
End: 2025-05-19
Payer: COMMERCIAL

## 2025-05-19 VITALS
SYSTOLIC BLOOD PRESSURE: 122 MMHG | DIASTOLIC BLOOD PRESSURE: 70 MMHG | WEIGHT: 168 LBS | OXYGEN SATURATION: 98 % | BODY MASS INDEX: 26.31 KG/M2 | HEART RATE: 68 BPM

## 2025-05-19 DIAGNOSIS — Z00.00 PREVENTATIVE HEALTH CARE: Primary | ICD-10-CM

## 2025-05-19 PROCEDURE — 1036F TOBACCO NON-USER: CPT | Performed by: FAMILY MEDICINE

## 2025-05-19 PROCEDURE — 99395 PREV VISIT EST AGE 18-39: CPT | Performed by: FAMILY MEDICINE

## 2025-05-19 NOTE — PROGRESS NOTES
Subjective   Kat Mcghee is a 39 y.o. female who presents for Annual Exam (Physical).    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 3  - START weight 220--> 215 --> 220 --> 193 in 90 days on the compounded semaglutide --> 178 --> 165  ( added phentermine) --> 167 --> 168   - [place] reviewed , last filled on 1/13/25 -- make her face flush so stopped it   - currently at 30 units and then is dropping down   - down 55#, down 25% TBW   - goal is 155#   - meal prepping and regular exercise   - pt has been eating healthy, watching calories, and regular exercise.   - did not like the phentermine - more anxious and off   - no insomnia, BP is good, no chest pain , palpitations   - BP is great today now at 116/80--> 124/76 --> 120/78   - new job, working with [employer]      #) back is better- will referred to new pain med doc last visit  - flares seem to responsive to steroids   - if stand or walks for too long starts to hurt   - tried an injection in the past   - on 1/17/22 ordered MRI and referral to pain management and ortho spine - MRI was denied  - has since seen Spine and Pain and both agree MRI would be greatly helpful to see if cyst has returned   - hurts so bad, 10/10 pain now   - pressure, pain in thighs and legs are getting weak   - starting on medrol dose pack 11/29/21- no improvement   - also strained her ankle doing lunges   - used old pain bills and flexeril - made her groggy   - motrin, stretches at home   - h/o disc herniations      #) supraclavicular LAD on the side of COVID vaccination - resolved   - no booster yet   - did not have live covid that she knows of      #) skin changes - not too bad  - varicose veins   - some throbbing and restless lags   - referred to vascular  med- didn’t want to get the procedure   - is wearing compression stocking     #) Migraines - not as frequent  - taking Vit D and B complex, drinking more water   - seems like they are worse after pregnancy   - Motrin, no longer using the imitrex  - Cambia not covered  - tried ubrelvy      #) Asthma - mild-- feels better   - exacerbated with physical activity   - no need for inhaler      #) circulation - brother had a brain aneurysm at 45  - other than a smoker, no chronic condition  - with pt having headaches she is worried   - normal ECHO and carotid US      #) Heart??  - daughter has a bicuspid aortic valve    - normal ECHO in 2021     ROS was completed and all systems are negative with the exception of what was noted in the the HPI.     Objective     /70   Pulse 68   Wt 76.2 kg (168 lb)   SpO2 98%   BMI 26.31 kg/m²      Physical Exam  GEN: A+O, no acute distress  HEENT: NC/AT, Oropharynx clear, no exudates, TM visualized, Extraoccular muscles intact, no facial droop; no thyromegaly or cervical LAD  RESP: CTAB, no wheezes   CV: RRR, no murmurs  ABD: soft, non-tender, + BS  SKIN: no rashes or bruising, no peripheral edema   NEURO: CN II-XII grossly intact, moves all extremities equally, no tremor   PSYCH: normal affect, appropriate mood     Assessment/Plan   Problem List Items Addressed This Visit    None    Labs on 4/14/25- Normal electrolytes, liver and kidney function. Calcium was every so slightly low, increase high protein dairy in your diet. Normal blood counts, normal thyroid level. Normal cholesterol     Continue to work on healthy, high protein diet and work on weighted exercises for at least 3 days per week.     You are down 50 pounds still  You are down 22% of total body weight.     Please monitor the urinary incontinence.    Try the neurtec as needed for headaches. Monitor for snoring and AM headaches     Follow up in 6 months or sooner as needed        Rubia Pro, DO, MSMed, KURTISM  [id number]   [place] [address]  Ph. [phone]  Fx. [phone]

## 2025-05-19 NOTE — PATIENT INSTRUCTIONS
Labs on 4/14/25- Normal electrolytes, liver and kidney function. Calcium was every so slightly low, increase high protein dairy in your diet. Normal blood counts, normal thyroid level. Normal cholesterol     Continue to work on healthy, high protein diet and work on weighted exercises for at least 3 days per week.     You are down 50 pounds still  You are down 22% of total body weight.     Please monitor the urinary incontinence.    Try the neurtec as needed for headaches. Monitor for snoring and AM headaches     Follow up in 6 months or sooner as needed

## 2025-08-20 ENCOUNTER — APPOINTMENT (OUTPATIENT)
Facility: CLINIC | Age: 40
End: 2025-08-20
Payer: COMMERCIAL

## 2025-08-20 VITALS
BODY MASS INDEX: 28.72 KG/M2 | DIASTOLIC BLOOD PRESSURE: 86 MMHG | WEIGHT: 183 LBS | HEIGHT: 67 IN | SYSTOLIC BLOOD PRESSURE: 124 MMHG

## 2025-08-20 DIAGNOSIS — Z12.4 CERVICAL CANCER SCREENING: ICD-10-CM

## 2025-08-20 DIAGNOSIS — Z01.419 WELL WOMAN EXAM WITH ROUTINE GYNECOLOGICAL EXAM: ICD-10-CM

## 2025-08-20 DIAGNOSIS — Z30.44 ENCOUNTER FOR SURVEILLANCE OF VAGINAL RING HORMONAL CONTRACEPTIVE DEVICE: ICD-10-CM

## 2025-08-20 PROCEDURE — 3008F BODY MASS INDEX DOCD: CPT | Performed by: OBSTETRICS & GYNECOLOGY

## 2025-08-20 PROCEDURE — 1036F TOBACCO NON-USER: CPT | Performed by: OBSTETRICS & GYNECOLOGY

## 2025-08-20 PROCEDURE — 99395 PREV VISIT EST AGE 18-39: CPT | Performed by: OBSTETRICS & GYNECOLOGY

## 2025-08-20 PROCEDURE — 87626 HPV SEP HI-RSK TYP&POOL RSLT: CPT

## 2025-08-20 RX ORDER — ETONOGESTREL AND ETHINYL ESTRADIOL VAGINAL RING .015; .12 MG/D; MG/D
RING VAGINAL
Qty: 3 EACH | Refills: 3 | Status: SHIPPED | OUTPATIENT
Start: 2025-08-20

## 2025-08-20 ASSESSMENT — LIFESTYLE VARIABLES
AUDIT-C TOTAL SCORE: 1
HOW OFTEN DO YOU HAVE A DRINK CONTAINING ALCOHOL: MONTHLY OR LESS
SKIP TO QUESTIONS 9-10: 1
HOW OFTEN DO YOU HAVE SIX OR MORE DRINKS ON ONE OCCASION: NEVER
HOW MANY STANDARD DRINKS CONTAINING ALCOHOL DO YOU HAVE ON A TYPICAL DAY: PATIENT DOES NOT DRINK

## 2025-08-20 ASSESSMENT — COLUMBIA-SUICIDE SEVERITY RATING SCALE - C-SSRS
1. IN THE PAST MONTH, HAVE YOU WISHED YOU WERE DEAD OR WISHED YOU COULD GO TO SLEEP AND NOT WAKE UP?: NO
2. HAVE YOU ACTUALLY HAD ANY THOUGHTS OF KILLING YOURSELF?: NO
6. HAVE YOU EVER DONE ANYTHING, STARTED TO DO ANYTHING, OR PREPARED TO DO ANYTHING TO END YOUR LIFE?: NO

## 2025-08-20 ASSESSMENT — ENCOUNTER SYMPTOMS
LOSS OF SENSATION IN FEET: 0
DEPRESSION: 0
OCCASIONAL FEELINGS OF UNSTEADINESS: 0

## 2025-08-20 ASSESSMENT — PAIN SCALES - GENERAL: PAINLEVEL_OUTOF10: 0-NO PAIN

## 2025-09-02 LAB
CYTOLOGY CMNT CVX/VAG CYTO-IMP: NORMAL
HPV HR 12 DNA GENITAL QL NAA+PROBE: NEGATIVE
HPV HR GENOTYPES PNL CVX NAA+PROBE: NEGATIVE
HPV16 DNA SPEC QL NAA+PROBE: NEGATIVE
HPV18 DNA SPEC QL NAA+PROBE: NEGATIVE
LAB AP HPV GENOTYPE QUESTION: YES
LAB AP HPV HR: NORMAL
LABORATORY COMMENT REPORT: NORMAL
PATH REPORT.TOTAL CANCER: NORMAL

## 2025-11-24 ENCOUNTER — APPOINTMENT (OUTPATIENT)
Dept: PRIMARY CARE | Facility: CLINIC | Age: 40
End: 2025-11-24
Payer: COMMERCIAL